# Patient Record
Sex: MALE | Race: WHITE | NOT HISPANIC OR LATINO | URBAN - METROPOLITAN AREA
[De-identification: names, ages, dates, MRNs, and addresses within clinical notes are randomized per-mention and may not be internally consistent; named-entity substitution may affect disease eponyms.]

---

## 2019-05-28 ENCOUNTER — INPATIENT (INPATIENT)
Facility: HOSPITAL | Age: 84
LOS: 5 days | Discharge: HOME HEALTH | DRG: 178 | End: 2019-06-03
Attending: INTERNAL MEDICINE | Admitting: INTERNAL MEDICINE
Payer: MEDICARE

## 2019-05-28 VITALS
DIASTOLIC BLOOD PRESSURE: 51 MMHG | HEART RATE: 102 BPM | WEIGHT: 154.98 LBS | HEIGHT: 69 IN | OXYGEN SATURATION: 91 % | RESPIRATION RATE: 28 BRPM | TEMPERATURE: 99 F | SYSTOLIC BLOOD PRESSURE: 118 MMHG

## 2019-05-28 DIAGNOSIS — J18.1 LOBAR PNEUMONIA, UNSPECIFIED ORGANISM: ICD-10-CM

## 2019-05-28 LAB
ALBUMIN SERPL ELPH-MCNC: 3.3 G/DL — SIGNIFICANT CHANGE UP (ref 3.3–5)
ALP SERPL-CCNC: 74 U/L — SIGNIFICANT CHANGE UP (ref 30–120)
ALT FLD-CCNC: 19 U/L DA — SIGNIFICANT CHANGE UP (ref 10–60)
ANION GAP SERPL CALC-SCNC: 7 MMOL/L — SIGNIFICANT CHANGE UP (ref 5–17)
AST SERPL-CCNC: 28 U/L — SIGNIFICANT CHANGE UP (ref 10–40)
BASOPHILS # BLD AUTO: 0.02 K/UL — SIGNIFICANT CHANGE UP (ref 0–0.2)
BASOPHILS NFR BLD AUTO: 0.1 % — SIGNIFICANT CHANGE UP (ref 0–2)
BILIRUB SERPL-MCNC: 1.2 MG/DL — SIGNIFICANT CHANGE UP (ref 0.2–1.2)
BUN SERPL-MCNC: 39 MG/DL — HIGH (ref 7–23)
CALCIUM SERPL-MCNC: 9 MG/DL — SIGNIFICANT CHANGE UP (ref 8.4–10.5)
CHLORIDE SERPL-SCNC: 100 MMOL/L — SIGNIFICANT CHANGE UP (ref 96–108)
CO2 SERPL-SCNC: 28 MMOL/L — SIGNIFICANT CHANGE UP (ref 22–31)
CREAT SERPL-MCNC: 1.92 MG/DL — HIGH (ref 0.5–1.3)
EOSINOPHIL # BLD AUTO: 0.01 K/UL — SIGNIFICANT CHANGE UP (ref 0–0.5)
EOSINOPHIL NFR BLD AUTO: 0.1 % — SIGNIFICANT CHANGE UP (ref 0–6)
FLU A RESULT: SIGNIFICANT CHANGE UP
FLU A RESULT: SIGNIFICANT CHANGE UP
FLUAV AG NPH QL: SIGNIFICANT CHANGE UP
FLUBV AG NPH QL: SIGNIFICANT CHANGE UP
GLUCOSE SERPL-MCNC: 144 MG/DL — HIGH (ref 70–99)
HCT VFR BLD CALC: 33.5 % — LOW (ref 39–50)
HGB BLD-MCNC: 11.3 G/DL — LOW (ref 13–17)
IMM GRANULOCYTES NFR BLD AUTO: 3.3 % — HIGH (ref 0–1.5)
INR BLD: 1.39 RATIO — HIGH (ref 0.88–1.16)
LACTATE SERPL-SCNC: 1.6 MMOL/L — SIGNIFICANT CHANGE UP (ref 0.7–2)
LYMPHOCYTES # BLD AUTO: 0.57 K/UL — LOW (ref 1–3.3)
LYMPHOCYTES # BLD AUTO: 4 % — LOW (ref 13–44)
MCHC RBC-ENTMCNC: 30.2 PG — SIGNIFICANT CHANGE UP (ref 27–34)
MCHC RBC-ENTMCNC: 33.7 GM/DL — SIGNIFICANT CHANGE UP (ref 32–36)
MCV RBC AUTO: 89.6 FL — SIGNIFICANT CHANGE UP (ref 80–100)
MONOCYTES # BLD AUTO: 0.75 K/UL — SIGNIFICANT CHANGE UP (ref 0–0.9)
MONOCYTES NFR BLD AUTO: 5.2 % — SIGNIFICANT CHANGE UP (ref 2–14)
NEUTROPHILS # BLD AUTO: 12.58 K/UL — HIGH (ref 1.8–7.4)
NEUTROPHILS NFR BLD AUTO: 87.3 % — HIGH (ref 43–77)
NRBC # BLD: 0 /100 WBCS — SIGNIFICANT CHANGE UP (ref 0–0)
NT-PROBNP SERPL-SCNC: HIGH PG/ML (ref 0–450)
PLATELET # BLD AUTO: 130 K/UL — LOW (ref 150–400)
POTASSIUM SERPL-MCNC: 4.5 MMOL/L — SIGNIFICANT CHANGE UP (ref 3.5–5.3)
POTASSIUM SERPL-SCNC: 4.5 MMOL/L — SIGNIFICANT CHANGE UP (ref 3.5–5.3)
PROT SERPL-MCNC: 7.1 G/DL — SIGNIFICANT CHANGE UP (ref 6–8.3)
PROTHROM AB SERPL-ACNC: 15.3 SEC — HIGH (ref 10–12.9)
RBC # BLD: 3.74 M/UL — LOW (ref 4.2–5.8)
RBC # FLD: 12.5 % — SIGNIFICANT CHANGE UP (ref 10.3–14.5)
RSV RESULT: SIGNIFICANT CHANGE UP
RSV RNA RESP QL NAA+PROBE: SIGNIFICANT CHANGE UP
SODIUM SERPL-SCNC: 135 MMOL/L — SIGNIFICANT CHANGE UP (ref 135–145)
WBC # BLD: 14.4 K/UL — HIGH (ref 3.8–10.5)
WBC # FLD AUTO: 14.4 K/UL — HIGH (ref 3.8–10.5)

## 2019-05-28 PROCEDURE — 99285 EMERGENCY DEPT VISIT HI MDM: CPT

## 2019-05-28 PROCEDURE — 93010 ELECTROCARDIOGRAM REPORT: CPT

## 2019-05-28 PROCEDURE — 71250 CT THORAX DX C-: CPT | Mod: 26

## 2019-05-28 PROCEDURE — 71046 X-RAY EXAM CHEST 2 VIEWS: CPT | Mod: 26

## 2019-05-28 RX ORDER — INSULIN LISPRO 100/ML
VIAL (ML) SUBCUTANEOUS AT BEDTIME
Refills: 0 | Status: DISCONTINUED | OUTPATIENT
Start: 2019-05-28 | End: 2019-06-03

## 2019-05-28 RX ORDER — SODIUM CHLORIDE 9 MG/ML
1000 INJECTION INTRAMUSCULAR; INTRAVENOUS; SUBCUTANEOUS ONCE
Refills: 0 | Status: COMPLETED | OUTPATIENT
Start: 2019-05-28 | End: 2019-05-28

## 2019-05-28 RX ORDER — HEPARIN SODIUM 5000 [USP'U]/ML
5000 INJECTION INTRAVENOUS; SUBCUTANEOUS EVERY 12 HOURS
Refills: 0 | Status: DISCONTINUED | OUTPATIENT
Start: 2019-05-28 | End: 2019-06-03

## 2019-05-28 RX ORDER — IPRATROPIUM/ALBUTEROL SULFATE 18-103MCG
3 AEROSOL WITH ADAPTER (GRAM) INHALATION ONCE
Refills: 0 | Status: COMPLETED | OUTPATIENT
Start: 2019-05-28 | End: 2019-05-28

## 2019-05-28 RX ORDER — PANTOPRAZOLE SODIUM 20 MG/1
40 TABLET, DELAYED RELEASE ORAL
Refills: 0 | Status: DISCONTINUED | OUTPATIENT
Start: 2019-05-28 | End: 2019-06-03

## 2019-05-28 RX ORDER — LACTOBACILLUS ACIDOPHILUS 100MM CELL
1 CAPSULE ORAL EVERY 8 HOURS
Refills: 0 | Status: DISCONTINUED | OUTPATIENT
Start: 2019-05-28 | End: 2019-06-03

## 2019-05-28 RX ORDER — PIPERACILLIN AND TAZOBACTAM 4; .5 G/20ML; G/20ML
3.38 INJECTION, POWDER, LYOPHILIZED, FOR SOLUTION INTRAVENOUS EVERY 12 HOURS
Refills: 0 | Status: DISCONTINUED | OUTPATIENT
Start: 2019-05-28 | End: 2019-05-31

## 2019-05-28 RX ORDER — INSULIN LISPRO 100/ML
VIAL (ML) SUBCUTANEOUS
Refills: 0 | Status: DISCONTINUED | OUTPATIENT
Start: 2019-05-28 | End: 2019-06-03

## 2019-05-28 RX ORDER — SODIUM CHLORIDE 9 MG/ML
1000 INJECTION INTRAMUSCULAR; INTRAVENOUS; SUBCUTANEOUS
Refills: 0 | Status: DISCONTINUED | OUTPATIENT
Start: 2019-05-28 | End: 2019-05-30

## 2019-05-28 RX ORDER — PIPERACILLIN AND TAZOBACTAM 4; .5 G/20ML; G/20ML
3.38 INJECTION, POWDER, LYOPHILIZED, FOR SOLUTION INTRAVENOUS ONCE
Refills: 0 | Status: COMPLETED | OUTPATIENT
Start: 2019-05-28 | End: 2019-05-28

## 2019-05-28 RX ORDER — ACETAMINOPHEN 500 MG
650 TABLET ORAL EVERY 6 HOURS
Refills: 0 | Status: DISCONTINUED | OUTPATIENT
Start: 2019-05-28 | End: 2019-06-03

## 2019-05-28 RX ORDER — GLUCAGON INJECTION, SOLUTION 0.5 MG/.1ML
1 INJECTION, SOLUTION SUBCUTANEOUS ONCE
Refills: 0 | Status: DISCONTINUED | OUTPATIENT
Start: 2019-05-28 | End: 2019-06-03

## 2019-05-28 RX ORDER — DEXTROSE 50 % IN WATER 50 %
12.5 SYRINGE (ML) INTRAVENOUS ONCE
Refills: 0 | Status: DISCONTINUED | OUTPATIENT
Start: 2019-05-28 | End: 2019-06-03

## 2019-05-28 RX ORDER — VANCOMYCIN HCL 1 G
1000 VIAL (EA) INTRAVENOUS ONCE
Refills: 0 | Status: COMPLETED | OUTPATIENT
Start: 2019-05-28 | End: 2019-05-28

## 2019-05-28 RX ORDER — DEXTROSE 50 % IN WATER 50 %
25 SYRINGE (ML) INTRAVENOUS ONCE
Refills: 0 | Status: DISCONTINUED | OUTPATIENT
Start: 2019-05-28 | End: 2019-06-03

## 2019-05-28 RX ORDER — DEXTROSE 50 % IN WATER 50 %
15 SYRINGE (ML) INTRAVENOUS ONCE
Refills: 0 | Status: DISCONTINUED | OUTPATIENT
Start: 2019-05-28 | End: 2019-06-03

## 2019-05-28 RX ORDER — SODIUM CHLORIDE 9 MG/ML
1000 INJECTION, SOLUTION INTRAVENOUS
Refills: 0 | Status: DISCONTINUED | OUTPATIENT
Start: 2019-05-28 | End: 2019-06-03

## 2019-05-28 RX ADMIN — PIPERACILLIN AND TAZOBACTAM 200 GRAM(S): 4; .5 INJECTION, POWDER, LYOPHILIZED, FOR SOLUTION INTRAVENOUS at 22:43

## 2019-05-28 RX ADMIN — Medication 250 MILLIGRAM(S): at 22:43

## 2019-05-28 RX ADMIN — SODIUM CHLORIDE 1000 MILLILITER(S): 9 INJECTION INTRAMUSCULAR; INTRAVENOUS; SUBCUTANEOUS at 22:43

## 2019-05-28 RX ADMIN — Medication 3 MILLILITER(S): at 21:59

## 2019-05-28 NOTE — ED PROVIDER NOTE - OBJECTIVE STATEMENT
89yo male bib ems from assisted living wthi cough, congestion today. pt states he has been having congestion and "gurgling" no sob, no vomiting or diarrhea

## 2019-05-28 NOTE — ED PROVIDER NOTE - PMH
Bladder cancer    Bladder polyps    BPH (benign prostatic hyperplasia)    HTN (hypertension)    LBBB (left bundle branch block)  noted on  EKG 2013  Obesity  BMI = 30.2  Prostate cancer  diagnosed in 2003; pt received radioactive seeds  Ureteric obstruction

## 2019-05-28 NOTE — ED PROVIDER NOTE - PSH
Bladder polyps  removed  Chest wall symptom  s/p reomval of shrapnel from right chest wall in 1952  H/O: hypertension    Prediabetes    S/P cataract surgery, left    S/P cystoscopy  multiple cystoscopies over 10 years with removal of  bladder polyps  S/P tonsillectomy  as a child

## 2019-05-28 NOTE — ED CLERICAL - CLERICAL COMMENTS
called dr. dexter at 1038 pm for dr. mosher called dr. dexter at 1038 pm for dr. mosher. called again at [phone].

## 2019-05-29 DIAGNOSIS — Z29.9 ENCOUNTER FOR PROPHYLACTIC MEASURES, UNSPECIFIED: ICD-10-CM

## 2019-05-29 DIAGNOSIS — I25.10 ATHEROSCLEROTIC HEART DISEASE OF NATIVE CORONARY ARTERY WITHOUT ANGINA PECTORIS: ICD-10-CM

## 2019-05-29 DIAGNOSIS — I10 ESSENTIAL (PRIMARY) HYPERTENSION: ICD-10-CM

## 2019-05-29 DIAGNOSIS — R74.8 ABNORMAL LEVELS OF OTHER SERUM ENZYMES: ICD-10-CM

## 2019-05-29 DIAGNOSIS — N17.9 ACUTE KIDNEY FAILURE, UNSPECIFIED: ICD-10-CM

## 2019-05-29 DIAGNOSIS — N28.9 DISORDER OF KIDNEY AND URETER, UNSPECIFIED: ICD-10-CM

## 2019-05-29 DIAGNOSIS — J18.1 LOBAR PNEUMONIA, UNSPECIFIED ORGANISM: ICD-10-CM

## 2019-05-29 LAB
ALBUMIN SERPL ELPH-MCNC: 2.8 G/DL — LOW (ref 3.3–5)
ALP SERPL-CCNC: 63 U/L — SIGNIFICANT CHANGE UP (ref 30–120)
ALT FLD-CCNC: 17 U/L DA — SIGNIFICANT CHANGE UP (ref 10–60)
ANION GAP SERPL CALC-SCNC: 8 MMOL/L — SIGNIFICANT CHANGE UP (ref 5–17)
APPEARANCE UR: CLEAR — SIGNIFICANT CHANGE UP
AST SERPL-CCNC: 29 U/L — SIGNIFICANT CHANGE UP (ref 10–40)
BASOPHILS # BLD AUTO: 0.02 K/UL — SIGNIFICANT CHANGE UP (ref 0–0.2)
BASOPHILS NFR BLD AUTO: 0.1 % — SIGNIFICANT CHANGE UP (ref 0–2)
BILIRUB SERPL-MCNC: 1.2 MG/DL — SIGNIFICANT CHANGE UP (ref 0.2–1.2)
BILIRUB UR-MCNC: NEGATIVE — SIGNIFICANT CHANGE UP
BUN SERPL-MCNC: 38 MG/DL — HIGH (ref 7–23)
CALCIUM SERPL-MCNC: 8.5 MG/DL — SIGNIFICANT CHANGE UP (ref 8.4–10.5)
CHLORIDE SERPL-SCNC: 100 MMOL/L — SIGNIFICANT CHANGE UP (ref 96–108)
CO2 SERPL-SCNC: 25 MMOL/L — SIGNIFICANT CHANGE UP (ref 22–31)
COLOR SPEC: YELLOW — SIGNIFICANT CHANGE UP
CREAT SERPL-MCNC: 1.85 MG/DL — HIGH (ref 0.5–1.3)
DIFF PNL FLD: ABNORMAL
EOSINOPHIL # BLD AUTO: 0 K/UL — SIGNIFICANT CHANGE UP (ref 0–0.5)
EOSINOPHIL NFR BLD AUTO: 0 % — SIGNIFICANT CHANGE UP (ref 0–6)
FERRITIN SERPL-MCNC: 432 NG/ML — HIGH (ref 30–400)
FOLATE SERPL-MCNC: >20 NG/ML — SIGNIFICANT CHANGE UP
GLUCOSE BLDC GLUCOMTR-MCNC: 108 MG/DL — HIGH (ref 70–99)
GLUCOSE BLDC GLUCOMTR-MCNC: 119 MG/DL — HIGH (ref 70–99)
GLUCOSE BLDC GLUCOMTR-MCNC: 121 MG/DL — HIGH (ref 70–99)
GLUCOSE BLDC GLUCOMTR-MCNC: 127 MG/DL — HIGH (ref 70–99)
GLUCOSE SERPL-MCNC: 122 MG/DL — HIGH (ref 70–99)
GLUCOSE UR QL: NEGATIVE MG/DL — SIGNIFICANT CHANGE UP
HBA1C BLD-MCNC: 5.4 % — SIGNIFICANT CHANGE UP (ref 4–5.6)
HCT VFR BLD CALC: 28.7 % — LOW (ref 39–50)
HCT VFR BLD CALC: 29.3 % — LOW (ref 39–50)
HGB BLD-MCNC: 9.7 G/DL — LOW (ref 13–17)
HGB BLD-MCNC: 9.8 G/DL — LOW (ref 13–17)
IMM GRANULOCYTES NFR BLD AUTO: 2.3 % — HIGH (ref 0–1.5)
IRON SATN MFR SERPL: 11 UG/DL — LOW (ref 45–165)
IRON SATN MFR SERPL: 6 % — LOW (ref 16–55)
KETONES UR-MCNC: NEGATIVE — SIGNIFICANT CHANGE UP
LDH SERPL L TO P-CCNC: 201 U/L — SIGNIFICANT CHANGE UP (ref 50–242)
LEUKOCYTE ESTERASE UR-ACNC: NEGATIVE — SIGNIFICANT CHANGE UP
LYMPHOCYTES # BLD AUTO: 0.96 K/UL — LOW (ref 1–3.3)
LYMPHOCYTES # BLD AUTO: 7.1 % — LOW (ref 13–44)
MCHC RBC-ENTMCNC: 29.7 PG — SIGNIFICANT CHANGE UP (ref 27–34)
MCHC RBC-ENTMCNC: 33.4 GM/DL — SIGNIFICANT CHANGE UP (ref 32–36)
MCV RBC AUTO: 88.8 FL — SIGNIFICANT CHANGE UP (ref 80–100)
MONOCYTES # BLD AUTO: 0.91 K/UL — HIGH (ref 0–0.9)
MONOCYTES NFR BLD AUTO: 6.7 % — SIGNIFICANT CHANGE UP (ref 2–14)
MRSA PCR RESULT.: SIGNIFICANT CHANGE UP
NEUTROPHILS # BLD AUTO: 11.37 K/UL — HIGH (ref 1.8–7.4)
NEUTROPHILS NFR BLD AUTO: 83.8 % — HIGH (ref 43–77)
NITRITE UR-MCNC: NEGATIVE — SIGNIFICANT CHANGE UP
NRBC # BLD: 0 /100 WBCS — SIGNIFICANT CHANGE UP (ref 0–0)
PH UR: 5 — SIGNIFICANT CHANGE UP (ref 5–8)
PLATELET # BLD AUTO: 121 K/UL — LOW (ref 150–400)
POTASSIUM SERPL-MCNC: 4.1 MMOL/L — SIGNIFICANT CHANGE UP (ref 3.5–5.3)
POTASSIUM SERPL-SCNC: 4.1 MMOL/L — SIGNIFICANT CHANGE UP (ref 3.5–5.3)
PROCALCITONIN SERPL-MCNC: 29.8 NG/ML — HIGH (ref 0.02–0.1)
PROT SERPL-MCNC: 6.3 G/DL — SIGNIFICANT CHANGE UP (ref 6–8.3)
PROT UR-MCNC: 30 MG/DL
RBC # BLD: 3.23 M/UL — LOW (ref 4.2–5.8)
RBC # BLD: 3.3 M/UL — LOW (ref 4.2–5.8)
RBC # FLD: 12.5 % — SIGNIFICANT CHANGE UP (ref 10.3–14.5)
RETICS #: 39.1 K/UL — SIGNIFICANT CHANGE UP (ref 25–125)
RETICS/RBC NFR: 1.2 % — SIGNIFICANT CHANGE UP (ref 0.5–2.5)
S AUREUS DNA NOSE QL NAA+PROBE: DETECTED
SODIUM SERPL-SCNC: 133 MMOL/L — LOW (ref 135–145)
SP GR SPEC: 1.02 — SIGNIFICANT CHANGE UP (ref 1.01–1.02)
TIBC SERPL-MCNC: 182 UG/DL — LOW (ref 220–430)
TROPONIN I SERPL-MCNC: 1.07 NG/ML — HIGH (ref 0.02–0.06)
TROPONIN I SERPL-MCNC: 1.22 NG/ML — HIGH (ref 0.02–0.06)
UIBC SERPL-MCNC: 171 UG/DL — SIGNIFICANT CHANGE UP (ref 110–370)
UROBILINOGEN FLD QL: NEGATIVE MG/DL — SIGNIFICANT CHANGE UP
VIT B12 SERPL-MCNC: 598 PG/ML — SIGNIFICANT CHANGE UP (ref 232–1245)
WBC # BLD: 13.57 K/UL — HIGH (ref 3.8–10.5)
WBC # FLD AUTO: 13.57 K/UL — HIGH (ref 3.8–10.5)

## 2019-05-29 PROCEDURE — 93970 EXTREMITY STUDY: CPT | Mod: 26

## 2019-05-29 PROCEDURE — 92610 EVALUATE SWALLOWING FUNCTION: CPT

## 2019-05-29 PROCEDURE — 76770 US EXAM ABDO BACK WALL COMP: CPT | Mod: 26

## 2019-05-29 PROCEDURE — 93010 ELECTROCARDIOGRAM REPORT: CPT

## 2019-05-29 RX ORDER — DIGOXIN 250 MCG
0.25 TABLET ORAL ONCE
Refills: 0 | Status: COMPLETED | OUTPATIENT
Start: 2019-05-29 | End: 2019-05-29

## 2019-05-29 RX ORDER — BUDESONIDE, MICRONIZED 100 %
0.5 POWDER (GRAM) MISCELLANEOUS
Refills: 0 | Status: DISCONTINUED | OUTPATIENT
Start: 2019-05-29 | End: 2019-06-03

## 2019-05-29 RX ORDER — MEMANTINE HYDROCHLORIDE 10 MG/1
5 TABLET ORAL
Refills: 0 | Status: DISCONTINUED | OUTPATIENT
Start: 2019-05-29 | End: 2019-06-03

## 2019-05-29 RX ORDER — ASPIRIN/CALCIUM CARB/MAGNESIUM 324 MG
81 TABLET ORAL DAILY
Refills: 0 | Status: DISCONTINUED | OUTPATIENT
Start: 2019-05-29 | End: 2019-06-03

## 2019-05-29 RX ORDER — METOPROLOL TARTRATE 50 MG
25 TABLET ORAL ONCE
Refills: 0 | Status: COMPLETED | OUTPATIENT
Start: 2019-05-29 | End: 2019-05-29

## 2019-05-29 RX ORDER — ATORVASTATIN CALCIUM 80 MG/1
10 TABLET, FILM COATED ORAL AT BEDTIME
Refills: 0 | Status: DISCONTINUED | OUTPATIENT
Start: 2019-05-29 | End: 2019-06-03

## 2019-05-29 RX ORDER — VENLAFAXINE HCL 75 MG
37.5 CAPSULE, EXT RELEASE 24 HR ORAL DAILY
Refills: 0 | Status: DISCONTINUED | OUTPATIENT
Start: 2019-05-29 | End: 2019-06-03

## 2019-05-29 RX ORDER — METOPROLOL TARTRATE 50 MG
25 TABLET ORAL
Refills: 0 | Status: DISCONTINUED | OUTPATIENT
Start: 2019-05-29 | End: 2019-06-03

## 2019-05-29 RX ORDER — DOXAZOSIN MESYLATE 4 MG
4 TABLET ORAL AT BEDTIME
Refills: 0 | Status: DISCONTINUED | OUTPATIENT
Start: 2019-05-29 | End: 2019-06-03

## 2019-05-29 RX ORDER — IPRATROPIUM/ALBUTEROL SULFATE 18-103MCG
3 AEROSOL WITH ADAPTER (GRAM) INHALATION EVERY 6 HOURS
Refills: 0 | Status: DISCONTINUED | OUTPATIENT
Start: 2019-05-29 | End: 2019-06-03

## 2019-05-29 RX ADMIN — Medication 3 MILLILITER(S): at 08:18

## 2019-05-29 RX ADMIN — Medication 1 TABLET(S): at 11:55

## 2019-05-29 RX ADMIN — Medication 1 TABLET(S): at 05:10

## 2019-05-29 RX ADMIN — Medication 25 MILLIGRAM(S): at 16:56

## 2019-05-29 RX ADMIN — Medication 200 MILLIGRAM(S): at 05:09

## 2019-05-29 RX ADMIN — MEMANTINE HYDROCHLORIDE 5 MILLIGRAM(S): 10 TABLET ORAL at 05:10

## 2019-05-29 RX ADMIN — Medication 1200 MILLIGRAM(S): at 16:49

## 2019-05-29 RX ADMIN — Medication 25 MILLIGRAM(S): at 16:57

## 2019-05-29 RX ADMIN — HEPARIN SODIUM 5000 UNIT(S): 5000 INJECTION INTRAVENOUS; SUBCUTANEOUS at 16:50

## 2019-05-29 RX ADMIN — ATORVASTATIN CALCIUM 10 MILLIGRAM(S): 80 TABLET, FILM COATED ORAL at 21:18

## 2019-05-29 RX ADMIN — SODIUM CHLORIDE 50 MILLILITER(S): 9 INJECTION INTRAMUSCULAR; INTRAVENOUS; SUBCUTANEOUS at 21:04

## 2019-05-29 RX ADMIN — PANTOPRAZOLE SODIUM 40 MILLIGRAM(S): 20 TABLET, DELAYED RELEASE ORAL at 05:11

## 2019-05-29 RX ADMIN — Medication 0.5 MILLIGRAM(S): at 19:54

## 2019-05-29 RX ADMIN — Medication 4 MILLIGRAM(S): at 21:19

## 2019-05-29 RX ADMIN — HEPARIN SODIUM 5000 UNIT(S): 5000 INJECTION INTRAVENOUS; SUBCUTANEOUS at 05:10

## 2019-05-29 RX ADMIN — PIPERACILLIN AND TAZOBACTAM 25 GRAM(S): 4; .5 INJECTION, POWDER, LYOPHILIZED, FOR SOLUTION INTRAVENOUS at 10:16

## 2019-05-29 RX ADMIN — Medication 0.25 MILLIGRAM(S): at 16:49

## 2019-05-29 RX ADMIN — Medication 81 MILLIGRAM(S): at 11:54

## 2019-05-29 RX ADMIN — Medication 1200 MILLIGRAM(S): at 05:10

## 2019-05-29 RX ADMIN — Medication 1 TABLET(S): at 21:18

## 2019-05-29 RX ADMIN — Medication 3 MILLILITER(S): at 19:56

## 2019-05-29 RX ADMIN — Medication 37.5 MILLIGRAM(S): at 11:54

## 2019-05-29 RX ADMIN — MEMANTINE HYDROCHLORIDE 5 MILLIGRAM(S): 10 TABLET ORAL at 16:57

## 2019-05-29 RX ADMIN — PIPERACILLIN AND TAZOBACTAM 25 GRAM(S): 4; .5 INJECTION, POWDER, LYOPHILIZED, FOR SOLUTION INTRAVENOUS at 21:06

## 2019-05-29 RX ADMIN — Medication 3 MILLILITER(S): at 03:05

## 2019-05-29 NOTE — H&P ADULT - NSHPSOCIALHISTORY_GEN_ALL_CORE
resident in [facility] for 1 year after sold house in [place] .Ambulates independently ,enjoyes country dancing .Denies smoking and etoh

## 2019-05-29 NOTE — H&P ADULT - PROBLEM SELECTOR PLAN 5
Gastrointestinal stress ulcer prophylaxis and DVT prophylaxis administrated serial bmp , nephrology eval , ins/outs ,check renal us

## 2019-05-29 NOTE — CONSULT NOTE ADULT - ASSESSMENT
89 yo male ,[facility] resident with hx of bladder cancer , bladder polyps , prostate cancer , BPH ,cataract ,obesity , urinary retention , HTN , CAD ,LBBB, HLD , PREDIABETES , DEPRESSION ,DEMENTIA presents to ED with cough and chest congestion x 2-3 days CTT showed LLL PNEUMONIA now with ckd and gene

## 2019-05-29 NOTE — SWALLOW BEDSIDE ASSESSMENT ADULT - SWALLOW EVAL: RECOMMENDED FEEDING/EATING TECHNIQUES
alternate food with liquid/hard swallow w/ each bite or sip/allow for swallow between intakes/maintain upright posture during/after eating for 30 mins/position upright (90 degrees)/oral hygiene

## 2019-05-29 NOTE — GOALS OF CARE CONVERSATION - PERSONAL ADVANCE DIRECTIVE - CONVERSATION DETAILS
Spoke to pt daughter regarding advance directives . Resuscitation was explained and she states pt wants no CPR or other extraordinary methods to keep him alive

## 2019-05-29 NOTE — H&P ADULT - HISTORY OF PRESENT ILLNESS
89 yo male ,[facility] resident with hx of bladder cancer , bladder polyps , prostate cancer , BPH ,cataract ,obesity , urinary retention , HTN , CAD ,LBBB, HLD , PREDIABETES , DEPRESSION ,DEMENTIA presents to ED with cough and chest congestion x 2-3 days CTT showed LLL PNEUMONIA Admitted for septic workup and evaluation,send blood and urine cx,serial lactate levels,monitor vitals closley,ivfs hydration,monitor urine output and renal profile,iv abx initiated -started on zosyn in ER Pulmonology consult called Found to have LBBB on EKG ( known from  2013 ) ,cardiology consult called .Found to have BNP elevation ,but no PVC or signs of fluid overload Admit to monitored unit for cardiac monitoring, obtain echo to evaluate LVEF, monitor ins/outs, monitor renal profile and electrolytes closely, cardiology consult ,send serial bnp , O2 supply, serial chest xrays, monitor weights and oral intake of fluids, nutritionist consult Palliative care consult requested ,to discuss advance directives and complete MOLST 89 yo male ,[facility] resident with hx of bladder cancer , bladder polyps , prostate cancer , BPH ,cataract ,obesity , urinary retention , HTN , CAD ,LBBB, HLD , PREDIABETES , DEPRESSION ,DEMENTIA presents to ED with cough and chest congestion x 2-3 days CTT showed LLL PNEUMONIA Admitted for septic workup and evaluation,send blood and urine cx,serial lactate levels,monitor vitals closley,ivfs hydration,monitor urine output and renal profile,iv abx initiated -started on zosyn in ER Pulmonology consult called Found to have LBBB on EKG ( known from  2013 ) ,cardiology consult called .Found to have BNP elevation ,but no PVC or signs of fluid overload Admit to monitored unit for cardiac monitoring, obtain echo to evaluate LVEF, monitor ins/outs, monitor renal profile and electrolytes closely, cardiology consult ,send serial bnp , O2 supply, serial chest xrays, monitor weights and oral intake of fluids, nutritionist consult Palliative care consult requested ,to discuss advance directives and complete MOLST Found to have troponin elevation ,likely related to renal insufficiency and demand ischemia 2/2 to pneumonia  Admitted  to telemetry unit for monitoring , send 3 sets of cardiac ensymes to rule out coronary event, obtain ECHO to evaluate LVEF, cardiology consult ,continue current management, O2 supply, anticoagulation plan as per cardiology consult

## 2019-05-29 NOTE — SWALLOW BEDSIDE ASSESSMENT ADULT - PHARYNGEAL PHASE
Decreased laryngeal elevation/Delayed pharyngeal swallow/Throat clear post oral intake/Delayed throat clear post oral intake/Delayed cough post oral intake/Cough post oral intake Delayed pharyngeal swallow/Cough post oral intake/Delayed cough post oral intake/Delayed throat clear post oral intake/Decreased laryngeal elevation/Throat clear post oral intake

## 2019-05-29 NOTE — CONSULT NOTE ADULT - SUBJECTIVE AND OBJECTIVE BOX
Patient chart was reviewed Workup and management orders based on current assessment have been entered to expedite patient care  Nursing notified for stat orders as applicable Detailed note and further workup and management orders (if needed)  will be entered in the space below / MD order section after patient has been examined Patient chart was reviewed Workup and management orders based on current assessment have been entered to expedite patient care  Nursing notified for stat orders as applicable Detailed note and further workup and management orders (if needed)  will be entered in the space below / MD order section after patient has been examined     ALVARO BAKER [facility] [address]  3/16/1929 DOA 2019 DR ELISSA DC  ALLERGY     nka   CONTACT     [address]r  [id number] self   Initial evaluation/Pulmonary Critical Care consultation requested on  2019   by Dr Dc  from Dr Obando   Patient examined chart reviewed    HOSPITAL ADMISSION [facility] DR ELISSA DC  PATIENT CAME  FROM (if information available)        TYPE OF VISIT      Subsequent Pulmonary followup     REASON FOR VISIT  For list of problems evaluated/addressed, please see problem list in the note below     PATIENT ALVARO BAKER [facility] [address]  3/16/1929 DOA 2019 DR ELISSA DC    PT DESCRIPTION     · Chief Complaint: The patient is a 89y Male complaining of  · HPI Objective Statement: 89yo male bib ems from assisted living wthi cough, congestion today. pt states he has been having congestion and "gurgling" no sob, no vomiting or diarrhea  · Presenting Symptoms: CONGESTION, COUGH  · Negative Findings: no shortness of breath, no vomiting  · Timing: gradual onset, constant  · Duration: today  · Quality: non-productive cough  · Severity: MILD  · Recent Exposure To: none known  · Relieving Factors: none    PAST MEDICAL/SURGICAL/FAMILY/SOCIAL HISTORY:    Past Medical History:  Bladder cancer    Bladder polyps    BPH (benign prostatic hyperplasia)    HTN (hypertension)    LBBB (left bundle branch block)  noted on  EKG   Obesity  BMI = 30.2  Prostate cancer  diagnosed in ; pt received radioactive seeds  Ureteric obstruction.     Past Surgical History:  Bladder polyps  removed  Chest wall symptom  s/p reomval of shrapnel from right chest wall in 2  H/O: hypertension    Prediabetes    S/P cataract surgery, left    S/P cystoscopy  multiple cystoscopies over 10 years with removal of  bladder polyps  S/P tonsillectomy  as a child.     Tobacco Usage:  · Tobacco Usage Former smoker    ALLERGIES AND HOME MEDICATIONS:   Allergies:        Allergies:  No Known Drug Allergies:   seasonal allergies: Miscellaneous, Other, seasonal allergies    Home Medications:   * Incomplete Medication History as of 24-Sep-2015 15:14 documented in Structured Notes  · see medication reconciliation sheet:       REVIEW OF SYSTEMS:    Review of Systems:  · RESPIRATORY: - - -  · Respiratory [+]: COUGH  · ROS STATEMENT: all other ROS negative except as per HPI    PHYSICAL EXAM:   · CONSTITUTIONAL: - - -  · Appearance: well appearing  · Development: well developed  · Distress: no apparent  · Mentation: awake, alert  · Mood: appropriate  · Nourishment: well  · CARDIAC: - - -  · CARDIAC RHYTHM: IRREGULAR  · CARDIAC RATE: normal  · CARDIAC SOUNDS: S1-S2  · CARDIAC MURMUR: no murmur  · RESPIRATORY: - - -  · Respiratory Distress: no  · Breath Sounds: RHONCHI  · Rhonchi: DIFFUSE  · Chest Exam: normal  · GASTROINTESTINAL: Abdomen soft, non-tender, no guarding.  · MUSCULOSKELETAL: Spine appears normal, range of motion is not limited, no muscle or joint tenderness  · NEUROLOGICAL: - - -  · NEURO LOC: alert  · NEURO NECK: normal  · NEURO SPEECH: clear  · SKIN: Skin normal color for race, warm, dry and intact. No evidence of rash.  · PSYCHIATRIC: - - -  · PSYCH LOC: alert    PATIENT NEEDLE OLIVIA [facility] [address]  3/16/1929 DOA 2019 DR ELISSA DC    VITALS/LABS       2019 W 14.4 Hb 11.3 Plt 130  Na 1135 K 4.5 CO2 28 Cr 1.9   2019 bnp [id number]    PATIENT NEEDLE OLIVIA [facility] [address] 56 84  3/16/1929 DOA 2019 DR ELISSA DC    PROBLEMS     PNEUMONIA   W 2019 W 14.4  C 2019 CT ch mf pneumesp lll and lingula   CHF  B  bnp 92104  OLIVIA   C 2019   Cr 1.9     PATIENT  ALVARO BAKER [facility] [address]  3/16/1929 DOA 2019 DR ELISSA DC    MEDICATIONS     CAD   Metoprolol 25.2 (2019)   ASA 81 (2019)   DVT P   hpsc (2019)   ASP PNEUM  zosyn (2019)   IV   NS 50 (2019)   DM   iss (2019)   COPD   Duoneb (2019)     GLOBAL ISSUE/BEST PRACTICE:      PROBLEM: HOB elevation:   y            PROBLEM: Stress ulcer proph:    na                      PROBLEM: VTE prophylaxis:       PROBLEM: Glycemic control:    na  PROBLEM: Nutrition:   Cons carb (2019)  PROBLEM: Advanced directive: na     PROBLEM: Allergies:  na        PATIENT ALVARO BAKER [facility] [address]  3/16/1929 DOA 2019 DR ELISSA DC      PT DESCRIPTION      89 m from [facility] with chest congestion admitted [facility] S 2019 with mf pneumposs chf     PMH Bladder ca BPH LBBB Obesity Prostate ca 2003 sp radioactive seeds ureteric obstruction     ER vital 118/51 102 28 99f 70

## 2019-05-29 NOTE — H&P ADULT - NEUROLOGICAL DETAILS
alert and oriented x 3/sensation intact/normal strength/responds to pain/responds to verbal commands/deep reflexes intact/no spontaneous movement

## 2019-05-29 NOTE — CONSULT NOTE ADULT - PROBLEM SELECTOR RECOMMENDATION 9
ACUTE RENAL FAILURE: hold diuretics   continue with hydration will f/u with bun and cr   Serum creatinine is stable at 1.85    , approximating GFR is decreased    ml/min.   There is  progression . No uremic symptoms    pos  evidence of anemia .  Fluid status stable.  Will continue to avoid nephrotoxic drugs.  Patient remains asymptomatic.   Continue current therapy.

## 2019-05-29 NOTE — H&P ADULT - ATTENDING COMMENTS
89 yo male ,[facility] resident with hx of bladder cancer , bladder polyps , prostate cancer , BPH ,cataract ,obesity , urinary retention , HTN , CAD ,LBBB, HLD , PREDIABETES , DEPRESSION ,DEMENTIA presents to ED with cough and chest congestion x 2-3 days CTT showed LLL PNEUMONIA Admitted for septic workup and evaluation,send blood and urine cx,serial lactate levels,monitor vitals closley,ivfs hydration,monitor urine output and renal profile,iv abx initiated -started on zosyn in ER Pulmonology consult called Found to have LBBB on EKG ( known from  2013 ) ,cardiology consult called .Found to have BNP elevation ,but no PVC or signs of fluid overload Admit to monitored unit for cardiac monitoring, obtain echo to evaluate LVEF, monitor ins/outs, monitor renal profile and electrolytes closely, cardiology consult ,send serial bnp , O2 supply, serial chest xrays, monitor weights and oral intake of fluids, nutritionist consult Palliative care consult requested ,to discuss advance directives and complete MOLST Found to have troponin elevation ,likely related to renal insufficiency and demand ischemia 2/2 to pneumonia  Admitted  to telemetry unit for monitoring , send 3 sets of cardiac ensymes to rule out coronary event, obtain ECHO to evaluate LVEF, cardiology consult ,continue current management, O2 supply, anticoagulation plan as per cardiology consult

## 2019-05-29 NOTE — H&P ADULT - PROBLEM SELECTOR PLAN 4
serial bmp ,monitor ins/outs , renal us , nephrology consult ,avoid nephrotoxic drugs likely 2/2 to renal insufficiency and demand ischemia due to pneumonia ,seen by cardiologist

## 2019-05-29 NOTE — CONSULT NOTE ADULT - SUBJECTIVE AND OBJECTIVE BOX
Infectious Diseases Consult by Cristopher Franklin MD    Reason for Consult :    HPI:  89 yo male ,long term resident with hx of bladder cancer , bladder polyps , prostate cancer , BPH ,cataract ,obesity , urinary retention , HTN , CAD ,LBBB, HLD , PREDIABETES , DEPRESSION ,DEMENTIA presents to ED with cough and chest congestion x 2-3 days CTT showed LLL PNEUMONIA Admitted for septic workup and evaluation on  -started on zosyn in ER Pulmonology consult called Found to have LBBB on EKG ( known from   ) ,cardiology consult called .Found to have BNP elevation ,    Patient was found to have congestion and "gurgling" no sob, no vomiting or diarrhea at the long term. No hx of fever . He was sen by speech therapy and has lubna scheduled for MBS      Past Medical & Surgical Hx:  PAST MEDICAL & SURGICAL HISTORY:  Bladder cancer  Obesity: BMI = 30.2  LBBB (left bundle branch block): noted on  EKG   Bladder polyps  Prostate cancer: diagnosed in ; pt received radioactive seeds  BPH (benign prostatic hyperplasia)  HTN (hypertension)  Ureteric obstruction  Prediabetes  H/O: hypertension  S/P cataract surgery, left  Bladder polyps: removed  S/P cystoscopy: multiple cystoscopies over 10 years with removal of  bladder polyps  S/P tonsillectomy: as a child  Chest wall symptom: s/p reomval of shrapnel from right chest wall in 195      Social History-- Retired, resident of long term  EtOH: denies   Tobacco: denies   Drug Use: denies     FAMILY HISTORY:      Allergies    No Known Drug Allergies  seasonal allergies (Other)    Intolerances        Home/ Out patient  Medications :  Home Medications:  aspirin 81 mg oral tablet, chewable: 81 milligram(s) orally once a day (28 May 2019 23:39)  atorvastatin 10 mg oral tablet: 1 tab(s) orally once a day (at bedtime) (28 May 2019 23:39)  memantine 5 mg oral tablet: 1 tab(s) orally 2 times a day (28 May 2019 23:39)  metoprolol tartrate 25 mg oral tablet: 1 tab(s) orally 2 times a day (28 May 2019 23:39)  see medication reconciliation sheet:    (28 May 2019 20:35)  Tab-A-Iman oral tablet: 1 tab(s) orally once a day (28 May 2019 23:39)  terazosin 5 mg oral capsule: 1 cap(s) orally once a day (at bedtime) (28 May 2019 23:39)  venlafaxine 37.5 mg oral tablet: 1 tab(s) orally once a day (28 May 2019 23:39)      Current Inpatient Medications :    ANTIBIOTICS:   piperacillin/tazobactam IVPB. 3.375 Gram(s) IV Intermittent every 12 hours      OTHER RELEVANT MEDICATIONS :  acetaminophen   Tablet .. 650 milliGRAM(s) Oral every 6 hours PRN  ALBUTerol/ipratropium for Nebulization 3 milliLiter(s) Nebulizer every 6 hours PRN  aspirin  chewable 81 milliGRAM(s) Oral daily  atorvastatin 10 milliGRAM(s) Oral at bedtime  buDESOnide   0.5 milliGRAM(s) Respule 0.5 milliGRAM(s) Inhalation two times a day  dextrose 40% Gel 15 Gram(s) Oral once PRN  dextrose 5%. 1000 milliLiter(s) IV Continuous <Continuous>  dextrose 50% Injectable 12.5 Gram(s) IV Push once  dextrose 50% Injectable 25 Gram(s) IV Push once  dextrose 50% Injectable 25 Gram(s) IV Push once  doxazosin 4 milliGRAM(s) Oral at bedtime  glucagon  Injectable 1 milliGRAM(s) IntraMuscular once PRN  guaiFENesin   Syrup  (Sugar-Free) 200 milliGRAM(s) Oral every 8 hours PRN  guaiFENesin ER 1200 milliGRAM(s) Oral every 12 hours  heparin  Injectable 5000 Unit(s) SubCutaneous every 12 hours  insulin lispro (HumaLOG) corrective regimen sliding scale   SubCutaneous three times a day before meals  insulin lispro (HumaLOG) corrective regimen sliding scale   SubCutaneous at bedtime  memantine 5 milliGRAM(s) Oral two times a day  metoprolol tartrate 25 milliGRAM(s) Oral two times a day  multivitamin 1 Tablet(s) Oral daily  pantoprazole    Tablet 40 milliGRAM(s) Oral before breakfast  sodium chloride 0.9%. 1000 milliLiter(s) IV Continuous <Continuous>  venlafaxine 37.5 milliGRAM(s) Oral daily      ROS:  CONSTITUTIONAL:  Negative fever or chills, feels well, good appetite  EYES:  Negative  blurry vision or double vision  CARDIOVASCULAR:  Negative for chest pain or palpitations  RESPIRATORY:  Positive  for cough, wheezing, or SOB   GASTROINTESTINAL:  Negative for nausea, vomiting, diarrhea, constipation, or abdominal pain  GENITOURINARY:  Negative frequency, urgency , dysuria or hematuria   NEUROLOGIC:  No headache, confusion, dizziness, lightheadedness  All other systems were reviewed and are negative          I&O's Detail    28 May 2019 07:01  -  29 May 2019 07:00  --------------------------------------------------------  IN:  Total IN: 0 mL    OUT:    Voided: 400 mL  Total OUT: 400 mL    Total NET: -400 mL          Physical Exam:  Vital Signs Last 24 Hrs  T(C): 36.4 (29 May 2019 11:17), Max: 37.3 (28 May 2019 20:24)  T(F): 97.6 (29 May 2019 11:17), Max: 99.2 (28 May 2019 20:24)  HR: 95 (29 May 2019 11:17) (81 - 113)  BP: 101/70 (29 May 2019 11:17) (88/68 - 118/51)  BP(mean): --  RR: 25 (29 May 2019 11:17) (18 - 28)  SpO2: 96% (29 May 2019 11:17) (91% - 98%)  Height (cm): 175.26 ( @ 20:24)  Weight (kg): 70.3 ( @ 20:24)  BMI (kg/m2): 22.9 ( @ 20:24)  BSA (m2): 1.85 ( @ 20:24)    General: well developed well nourished, in no acute distress  Eyes: sclera anicteric, pupils equal and reactive to light  ENMT: buccal mucosa moist, pharynx not injected  Neck: supple, trachea midline  Lungs: coarse breath sounds  no wheeze/rhonchi  Cardiovascular: regular rate and rhythm, S1 S2  Abdomen: soft, nontender, no organomegaly present, bowel sounds normal  Neurological:  alert and oriented x1, Cranial Nerves II-XII grossly intact  Skin:no increased ecchymosis/petechiae/purpura  Lymph Nodes: no palpable cervical/supraclavicular lymph nodes enlargements  Extremities: no cyanosis/clubbing, has 1 + leg edema     Labs:                    9.8    13.57  )----------(  121       ( 29 May 2019 06:47 )               29.3      133    |  100    |  38     ----------------------------<  122        ( 29 May 2019 06:47 )  4.1     |  25     |  1.85     Ca    8.5        ( 29 May 2019 06:47 )    TPro  6.3    /  Alb  2.8    /  TBili  1.2    /  DBili  x      /  AST  29     /  ALT  17     /  AlkPhos  63     ( 29 May 2019 06:47 )    LIVER FUNCTIONS - ( 29 May 2019 06:47 )  Alb: 2.8 g/dL / Pro: 6.3 g/dL / ALK PHOS: 63 U/L / ALT: 17 U/L DA / AST: 29 U/L / GGT: x           PT/INR -  15.3 sec / 1.39 ratio   ( 28 May 2019 21:05 )     CARDIAC MARKERS ( 29 May 2019 06:47 )  1.217 ng/mL / x     / x     / x     / x        Serum Pro-Brain Natriuretic Peptide (19 @ 21:05)    Serum Pro-Brain Natriuretic Peptide: 21540 pg/mL    Urinalysis Basic - ( 29 May 2019 02:45 )    Color: Yellow / Appearance: Clear / S.020 / pH: x  Gluc: x / Ketone: Negative  / Bili: Negative / Urobili: Negative mg/dL   Blood: x / Protein: 30 mg/dL / Nitrite: Negative   Leuk Esterase: Negative / RBC: 0-2 /HPF / WBC Negative   Sq Epi: x / Non Sq Epi: Occasional / Bacteria: Few    Lactate, Blood: 1.6 mmol/L (19 @ 21:05)      RECENT CULTURES:    FLU A B RSV Detection by PCR (19 @ 21:05)    Flu A Result: NotDete: The Flu A B RSV assay is a Real-Time PCR test for the qualitative  detection and differentiation of Influenza A, Influenza B, and  Respiratory Syncytial Virus on nasopharyngeal swabs. The results should  be interpreted in the context of all clinical and laboratory findings.    Flu B Result: NotDetec    RSV Result: NotDetec          RADIOLOGY & ADDITIONAL STUDIES:    US Duplex Venous Lower Ext Complete, Bilateral (19 @ 07:46) >  FINDINGS:  There is color and spectral flow, compression and augmentation of the   common femoral, superficial femoral and popliteal veins.    There is flow in the posterior tibial vein.    IMPRESSION:  No evidence of DVT.      CT Chest No Cont (19 @ 21:50) >  CHEST:     LUNGS AND LARGE AIRWAYS: Patent central airways. Mild emphysema. Patchy   left lower lobe and lingular nodular airspace opacities compatible with   pneumonia. Dependent atelectasis at the lung bases. Tree-in-bud nodules   scattered throughout the lower lobes and lingula..  PLEURA: No pleural effusion.  VESSELS: Coronary artery calcifications. Atherosclerotic calcifications   of the aorta and great vessels.  HEART: Heart size is normal.No pericardial effusion.  MEDIASTINUM AND MITCH: No lymphadenopathy.  CHEST WALL AND LOWER NECK: Within normal limits.  VISUALIZED UPPER ABDOMEN: Upper lobe renal cyst.  BONES: Degenerative changes of the spine.    IMPRESSION: Multifocal pneumonia predominantly involving the left lower   lobe and lingula.      Xray Chest 2 Views PA/Lat (19 @ 20:40) >  Findings: Right-sided metallic axillary surgical clips are noted.    There is pulmonary vascular congestion and/or edema. The heart size is   enlarged. Multilevel degenerative changes are noted within the imaged   potions of the spine.    IMPRESSION: Pulmonary vascular congestion and/or edema.      Assessment :   89 yo male ,long term resident with hx of bladder cancer , bladder polyps , prostate cancer , BPH ,cataract ,obesity , urinary retention , HTN , CAD ,LBBB, HLD , PREDIABETES , DEPRESSION ,DEMENTIA presents to ED with cough and chest congestion x 2-3 days CTT showed LLL Nodular and patchy pneumonia , as he has oropharyngeal dysphagia likely its is aspiration pneumonia . He is afebrile and does not have significant leukocytosis.    He could have CHF exacerbation , he has elevated troponin and PBNP     Plan :   - will follow procalcitonin and cs results , till than continue with Zosyn   - screen for MRSA  - follow MBS and echo   - await cardiology evaluaiton   - aspiration precautions   - consider DC IVF     Continue with present regime .  Appropriate use of antibiotics and adverse effects reviewed.      I have discussed the above plan of care with patient and his family in detail. They expressed understanding of the treatment plan . Risks, benefits and alternatives discussed in detail. I have asked if they have any questions or concerns and appropriately addressed them to the best of my ability . Goals of care conversation and MOLST form to be done by palliative care team       > 55  minutes spent in direct patient care reviewing  the notes, lab data/ imaging , discussion with multidisciplinary team. All questions were addressed and answered to the best of my capacity .    Thank you for allowing me to participate in the care of your patient .      Cristopher Franklin MD  [phone]

## 2019-05-29 NOTE — H&P ADULT - NSICDXPASTMEDICALHX_GEN_ALL_CORE_FT
PAST MEDICAL HISTORY:  Bladder cancer     Bladder polyps     BPH (benign prostatic hyperplasia)     HTN (hypertension)     LBBB (left bundle branch block) noted on  EKG 2013    Obesity BMI = 30.2    Prostate cancer diagnosed in 2003; pt received radioactive seeds    Ureteric obstruction

## 2019-05-29 NOTE — SWALLOW BEDSIDE ASSESSMENT ADULT - SWALLOW EVAL: RECOMMENDED DIET
will defer PO diet to MD at this time pending completion of recommended MBSS, given presence of baseline cough which persisted throughout evaluation and disallowed for formal r/o of penetration/aspiration via clinical assessment alone.

## 2019-05-29 NOTE — H&P ADULT - NSHPLABSRESULTS_GEN_ALL_CORE
< from: US Duplex Venous Lower Ext Complete, Bilateral (05.29.19 @ 07:46) >    EXAM:  US DPLX LWR EXT VEINS COMPL BI                                  PROCEDURE DATE:  05/29/2019          INTERPRETATION:  CLINICAL STATEMENT: Swelling leg.    TECHNIQUE: Ultrasound of bilateral lower extremity deep venous system.    COMPARISON: None.    FINDINGS:  There is color and spectral flow, compression and augmentation of the   common femoral, superficial femoral and popliteal veins.    There is flow in the posterior tibial vein.    IMPRESSION:  No evidence of DVT.      < end of copied text >    < from: CT Chest No Cont (05.28.19 @ 21:50) >    EXAM:  CT CHEST                                  PROCEDURE DATE:  05/28/2019          INTERPRETATION:  CLINICAL INFORMATION: Shortness of breath    COMPARISON: None    PROCEDURE:   CT of the Chest was performed without intravenous contrast.  Sagittal and coronal reformats were performed.      FINDINGS:    CHEST:     LUNGS AND LARGE AIRWAYS: Patent central airways. Mild emphysema. Patchy   left lower lobe and lingular nodular airspace opacities compatible with   pneumonia. Dependent atelectasis at the lung bases. Tree-in-bud nodules   scattered throughout the lower lobes and lingula..  PLEURA: No pleural effusion.  VESSELS: Coronary artery calcifications. Atherosclerotic calcifications   of the aorta and great vessels.  HEART: Heart size is normal.No pericardial effusion.  MEDIASTINUM AND MITCH: No lymphadenopathy.  CHEST WALL AND LOWER NECK: Within normal limits.  VISUALIZED UPPER ABDOMEN: Upper lobe renal cyst.  BONES: Degenerative changes of the spine.    IMPRESSION: Multifocal pneumonia predominantly involving the left lower   lobe and lingula.    < end of copied text >

## 2019-05-29 NOTE — H&P ADULT - NEGATIVE GASTROINTESTINAL SYMPTOMS
no nausea/no hematochezia/no vomiting/no flatulence/no abdominal pain/no melena/no diarrhea/no change in bowel habits/no constipation

## 2019-05-29 NOTE — SWALLOW BEDSIDE ASSESSMENT ADULT - COMMENTS
Pt was awake and cooperative for a clinical assessment of swallow function this AM. Pt presents with supplemental oxygen in place via nasal cannula with SaO2 noted 94-97% throughout evaluation. Per charting, pt is a "89 yo male ,[facility] resident with hx of bladder cancer , bladder polyps , prostate cancer , BPH ,cataract ,obesity , urinary retention , HTN , CAD ,LBBB, HLD , PREDIABETES , DEPRESSION ,DEMENTIA presents to ED with cough and chest congestion x 2-3 days CTT showed LLL PNEUMONIA Admitted for septic workup and evaluation,send blood and urine cx,serial lactate levels,monitor vitals closley,ivfs hydration,monitor urine output and renal profile,iv abx initiated -started on zosyn in ER Pulmonology consult called Found to have LBBB on EKG ( known from  2013 ) ,cardiology consult called .Found to have BNP elevation ,but no PVC or signs of fluid overload Admit to monitored unit for cardiac monitoring, obtain echo to evaluate LVEF, monitor ins/outs, monitor renal profile and electrolytes closely, cardiology consult ,send serial bnp , O2 supply, serial chest xrays, monitor weights and oral intake of fluids, nutritionist consult Palliative care consult requested ,to discuss advance directives and complete MOLST Found to have troponin elevation ,likely related to renal insufficiency and demand ischemia 2/2 to pneumonia  Admitted  to telemetry unit for monitoring , send 3 sets of cardiac ensymes to rule out coronary event, obtain ECHO to evaluate LVEF, cardiology consult ,continue current management, O2 supply, anticoagulation plan as per cardiology consult". Recent chest CT revealed "Multifocal pneumonia predominantly involving the left lower lobe and lingula. "    At the time of today's assessment, pt presents with baseline cough and wet upper airway breath sounds prior to provision of PO trials

## 2019-05-29 NOTE — CONSULT NOTE ADULT - ASSESSMENT
The patient is a 89 year old male with a history of HTN, HL, bladder cancer, CAD, LBBB, dementia who presents with cough and congestion.     Plan:  - ECG with LBBB which is old  - Troponin elevated at 1.217 likely in the setting of PNA. Continue to trend.  - Continue aspirin 81 mg daily  - Check echocardiogram  - CT chest with multifocal PNA  - BNP elevated at [address] although no evidence of decompensated heart failure on exam  - Gentle hydration  - On zosyn and vancomycin  - Continue metoprolol tartrate 25 mg bid with holding parameters  - Pulm follow-up The patient is a 89 year old male with a history of HTN, HL, bladder cancer, CAD, LBBB, dementia who presents with cough and congestion.     Plan:  - ECG with LBBB which is old  - Troponin elevated at 1.217 likely in the setting of PNA. Continue to trend.  - Continue aspirin 81 mg daily  - Check echocardiogram  - CT chest with multifocal PNA  - BNP elevated at 9[address] although no evidence of decompensated heart failure on exam  - Gentle hydration  - On zosyn and vancomycin  - Continue metoprolol tartrate 25 mg bid with holding parameters  - Pulm follow-up    ADDENDUM:  - Patient went into rapid AF with aberrancy and intermittent PVCs. Will give an additional dose of metoprolol tartrate 25 mg PO and digoxin 0.25 mg PO now.

## 2019-05-29 NOTE — H&P ADULT - ASSESSMENT
89 yo male ,[facility] resident with hx of bladder cancer , bladder polyps , prostate cancer , BPH ,cataract ,obesity , urinary retention , HTN , CAD ,LBBB, HLD , PREDIABETES , DEPRESSION ,DEMENTIA presents to ED with cough and chest congestion x 2-3 days CTT showed LLL PNEUMONIA Admitted for septic workup and evaluation,send blood and urine cx,serial lactate levels,monitor vitals closley,ivfs hydration,monitor urine output and renal profile,iv abx initiated -started on zosyn in ER Pulmonology consult called Found to have LBBB on EKG ( known from  2013 ) ,cardiology consult called .Found to have BNP elevation ,but no PVC or signs of fluid overload Admit to monitored unit for cardiac monitoring, obtain echo to evaluate LVEF, monitor ins/outs, monitor renal profile and electrolytes closely, cardiology consult ,send serial bnp , O2 supply, serial chest xrays, monitor weights and oral intake of fluids, nutritionist consult Palliative care consult requested ,to discuss advance directives and complete MOLST Found to have troponin elevation ,likely related to renal insufficiency and demand ischemia 2/2 to pneumonia  Admitted  to telemetry unit for monitoring , send 3 sets of cardiac ensymes to rule out coronary event, obtain ECHO to evaluate LVEF, cardiology consult ,continue current management, O2 supply, anticoagulation plan as per cardiology consult

## 2019-05-29 NOTE — H&P ADULT - RS GEN PE MLT RESP DETAILS PC
diminished breath sounds, R/respirations non-labored/breath sounds equal/diminished breath sounds, L/good air movement/airway patent/rhonchi

## 2019-05-29 NOTE — H&P ADULT - GASTROINTESTINAL DETAILS
normal/no distention/no masses palpable/nontender/no bruit/soft/bowel sounds normal/no rebound tenderness

## 2019-05-29 NOTE — H&P ADULT - NEGATIVE CARDIOVASCULAR SYMPTOMS
no dyspnea on exertion/no peripheral edema/no chest pain/no paroxysmal nocturnal dyspnea/no claudication

## 2019-05-29 NOTE — CONSULT NOTE ADULT - SUBJECTIVE AND OBJECTIVE BOX
History of Present Illness: The patient is a 89 year old male with a history of HTN, HL, bladder cancer, CAD, LBBB, dementia who presents with cough and congestion. The patient is a poor historian. He has no complaints. As per notes, he has been having cough and congestion for the past 2-3 days.    Past Medical/Surgical History:  HTN, HL, bladder cancer, CAD, LBBB, dementia     Medications:  Home Medications:  aspirin 81 mg oral tablet, chewable: 81 milligram(s) orally once a day (28 May 2019 23:39)  atorvastatin 10 mg oral tablet: 1 tab(s) orally once a day (at bedtime) (28 May 2019 23:39)  memantine 5 mg oral tablet: 1 tab(s) orally 2 times a day (28 May 2019 23:39)  metoprolol tartrate 25 mg oral tablet: 1 tab(s) orally 2 times a day (28 May 2019 23:39)  see medication reconciliation sheet:    (28 May 2019 20:35)  Tab-A-Iman oral tablet: 1 tab(s) orally once a day (28 May 2019 23:39)  terazosin 5 mg oral capsule: 1 cap(s) orally once a day (at bedtime) (28 May 2019 23:39)  venlafaxine 37.5 mg oral tablet: 1 tab(s) orally once a day (28 May 2019 23:39)      Family History: Non-contributory family history of premature cardiovascular atherosclerotic disease    Social History: No tobacco, alcohol or drug use    Review of Systems:  General: No fevers, chills, weight loss or gain  Skin: No rashes, color changes  Cardiovascular: No chest pain, orthopnea  Respiratory: No shortness of breath, cough  Gastrointestinal: No nausea, abdominal pain  Genitourinary: No incontinence, pain with urination  Musculoskeletal: No pain, swelling, decreased range of motion  Neurological: No headache, weakness  Psychiatric: No depression, anxiety  Endocrine: No weight loss or gain, increased thirst  All other systems are comprehensively negative.    Physical Exam:  Vitals:        Vital Signs Last 24 Hrs  T(C): 36.5 (29 May 2019 07:51), Max: 37.3 (28 May 2019 20:24)  T(F): 97.7 (29 May 2019 07:51), Max: 99.2 (28 May 2019 20:24)  HR: 81 (29 May 2019 07:51) (81 - 102)  BP: 114/50 (29 May 2019 07:51) (88/68 - 118/51)  BP(mean): --  RR: 25 (29 May 2019 07:51) (18 - 28)  SpO2: 98% (29 May 2019 07:51) (91% - 98%)  General: NAD  HEENT: MMM  Neck: No JVD, no carotid bruit  Lungs: CTAB  CV: RRR, nl S1/S2, no M/R/G  Abdomen: S/NT/ND, +BS  Extremities: No LE edema, no cyanosis  Neuro: AAOx3, non-focal  Skin: No rash    Labs:                        9.8    13.57 )-----------( 121      ( 29 May 2019 06:47 )             29.3     05-29    133<L>  |  100  |  38<H>  ----------------------------<  122<H>  4.1   |  25  |  1.85<H>    Ca    8.5      29 May 2019 06:47    TPro  6.3  /  Alb  2.8<L>  /  TBili  1.2  /  DBili  x   /  AST  29  /  ALT  17  /  AlkPhos  63  05-29    CARDIAC MARKERS ( 29 May 2019 06:47 )  1.217 ng/mL / x     / x     / x     / x          PT/INR - ( 28 May 2019 21:05 )   PT: 15.3 sec;   INR: 1.39 ratio             ECG: NSR, LBBB, PVC

## 2019-05-29 NOTE — CONSULT NOTE ADULT - ASSESSMENT
PATIENT ALVARO BAKER [facility] [phone]  3/16/1929 DOA 2019 DR ELISSA BECKHAM    ASSESSMENT/RECOMMENDATIONS (A/R)     POSS ASP PNEUM   On zosun  Check procalc speech eval mrsa   CHF  Chcek echo   COPD   On BD  RESP   Target po 90-95%                TIME SPENT Over 55 minutes aggregate care time spent on encounter; activities included   direct pt care, counseling and/or coordinating care reviewing notes, lab data/ imaging , discussion with multidisciplinary team/ pt /family. Risks, benefits, alternatives  discussed in detail.

## 2019-05-29 NOTE — H&P ADULT - MUSCULOSKELETAL
details… detailed exam ROM intact/no joint erythema/no joint swelling/no joint warmth/no calf tenderness

## 2019-05-29 NOTE — H&P ADULT - PROBLEM SELECTOR PLAN 1
Admitted for septic workup and evaluation,send blood and urine cx,serial lactate levels,monitor vitals closley,ivfs hydration,monitor urine output and renal profile,iv abx initiated Admitted for septic workup and evaluation,send blood and urine cx,serial lactate levels,monitor vitals closley,ivfs hydration,monitor urine output and renal profile,iv abx initiated ,pulm and ID cons called

## 2019-05-29 NOTE — SWALLOW BEDSIDE ASSESSMENT ADULT - SWALLOW EVAL: DIAGNOSIS
1- functional oral management given puree, honey thick liquids, nectar thick liquids and thin liquid textures marked by adequate bolus collection, transfer and transport. 2- mild oral dysphagia given solids marked by prolonged mastication resulting in delayed oral preparation/AP transit with mild lingual residue remaining post swallow. pt is able to clear noted stasis given liquid wash. 3- mild pharyngeal dysphagia given solid, puree, honey thick liquid, nectar thick liquid and thin liquid textures marked by delayed swallow trigger and reduced hyolaryngeal excursion. baseline cough is noted to persist throughout today's evaluation, which disallows for formal r/o of penetration/aspiration via clinical assessment alone. recommend objective assessment (MBS) to formally assess swallow mechanism.

## 2019-05-29 NOTE — ED ADULT NURSE REASSESSMENT NOTE - NS ED NURSE REASSESS COMMENT FT1
EKG tech notified unit of change in patient rhythm converting to afib. Patient assessed and remains GCS 15, reports no discomfort. Cardiologist MD Horvath notified and made aware of event. Repeat 12 lead EKG completed as instructed. continue to monitor.

## 2019-05-29 NOTE — H&P ADULT - VASCULAR
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 A chaperone is not applicable. She is accompanied by no one.  services not used.        Quality    Smoking Cessation CI no tobacco use and did not provide intervention and counseling in regards to tobacco use.      History of Present Illness  She reported: Feeling tired (fatigue) ? Fe def. recent weight loss -10n lbs in 2 years and no recent weight gain.  Palpitations.-  Excessive sweating noho t flashes. No loss of hair from the head or body thinning.  History of papillary carcinoma of the thyroid gland. History of iodine 131    History of thyroid surgery    No lump or swelling in the neck and no swollen glands in the neck. No hoarseness. No dysphagia, no recent increase in bowel frequency, and no constipation. No tremor while on Rx.  No history of irradiation of the neck.      Review of Systems  Const: recent -10 lbs weight loss and Normal.   Allergy & Immunology: Normal.   Eyes: Normal.   ENT: Normal.   CV: Normal.   Resp: Normal.   Breast: Normal.   GI: Normal.   : Normal.   Endo: Normal.   Heme/Lymph: Normal.   Musc: Normal.   Neuro: Normal.   Psych: Normal.   Skin: Normal.       Allergies  Cephalexin TABS  Penicillins  Sulfa Drugs    Current Meds   1. Bystolic 5 MG Oral Tablet; TAKE 1 TABLET DAILY;   Therapy: 30Nov2010 to (Evaluate:17Sep2012)  Requested for: 19Jun2012 Recorded   2. Levothyroxine Sodium 150 MCG Oral Tablet; TAKE 1 TABLET DAILY MONDAY-SATURDAY   AND 0.5 TABLET ON SUNDAY alternating with 1 tablets Monday through Saturday   and 0 tablets on Sunday;   Therapy: 27Aug2009 to (Evaluate:[id number])  Requested for: 06Aug2018; Last   Rx:06Aug2018 Ordered   3. Lexapro 20 MG Oral Tablet; TAKE 1 TABLET BY MOUTH EVERY DAY;    Therapy: 02Mar2010 to  Requested for: 02Mar2010 Recorded   4. Prevacid 15 MG Oral Capsule Delayed Release; TAKE 1 CAPSULE DAILY;   Therapy: 28Mar2011 to  Requested for: 28Mar2011 Recorded   5. 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      Social History  Never smoker    Vitals  Vital Signs    Recorded: 14Aug2018 02:51PM   Height 5 ft 5 in   Weight 203 lb    BMI Calculated 33.78   BSA Calculated 1.99   Systolic 128   Diastolic 69   Temperature 96.8 F   Heart Rate 60     Physical Exam  Constitutional: alert.   Eyes: no eyelid swelling and no ptosis.   Neck: No thyroid tissue palpable.   Lymphatic: no lymphadenopathy.   Pulmonary: normal respiratory rate and effort. breath sounds clear to auscultation bilaterally.   Cardiovascular: normal rate and regular rhythm. no right carotid bruit right dorsalis pedis 2+ no left carotid bruit left dorsalis pedis 2+ edema was not present in the lower extremities.   Abdomen: soft and nontender. no hepatomegaly and no splenomegaly.   Neurologic: Deep tendon reflexes: 1+ left ankle jerk2+ right ankle jerk.      Results/Data    09Aug2016 02:09PM   TSH   TSH: <0.008 mcUnits/mL Abnormal Low Reference Range 0.350-5.000  T4, FREE   FREE T4: 1.6 ng/dl Abnormal High Reference Range 0.8-1.5  CALCIUM   CALCIUM: 9.4 mg/dl Reference Range 8.4-10.2  PHOSPHORUS   PHOSPHORUS: 4.0 mg/dl Reference Range 2.4-4.7  THYROGLOBULIN WITH REFLEX   THYROGLOBULIN ANTIBODY: <0.9 IU/ml Reference Range 0.0-4.0  THYROGLOBULIN: 0.1 ng/ml Abnormal Low Reference Range 1.2-35.0  [id number] 10:15AM   T4, FREE   FREE T4: 1.5 ng/dl Reference Range 0.8-1.5  TSH   TSH: 0.010 mcUnits/mL Abnormal Low  Reference Range 0.350-5.000  THYROGLOBULIN WITH REFLEX   THYROGLOBULIN ANTIBODY: <0.9 IU/ml Reference Range 0.0-4.0  THYROGLOBULIN: <0.1 ng/ml Abnormal Low Reference Range 1.2-35.0  [id number] 02:48PM   T4, FREE   FREE T4: 1.5 ng/dl Reference Range 0.8-1.5  TSH   TSH: 0.013 mcUnits/mL Abnormal Low Reference Range 0.350-5.000  Assessment  Papillary adenocarcinoma of thyroid (C73)  Hypothyroidism, 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  TSH.052 on 132/day-goal.1-.5- decrease to 125 daily and repeat thyroid functions  Latest thyroglobulin remains suppressed at less than 0.1, with negative antibodies  Ultrasound from 8/16 with stable 7 x 4 x 5 cm isoechoic nodule in the left thyroid bed- followup thyroid US.      Signatures   Electronically signed by : Maria E Garnica CMA; Aug 14 2018  2:51PM CST    Electronically signed by : XIOMARA JOSEPH M.D.; Sep 11 2018  9:24PM CST     Equal and normal pulses (carotid, femoral, dorsalis pedis)

## 2019-05-29 NOTE — SWALLOW BEDSIDE ASSESSMENT ADULT - ASR SWALLOW ASPIRATION MONITOR
fever/throat clearing/oral hygiene/cough/pneumonia/change of breathing pattern/position upright (89Y)/gurgly voice/upper respiratory infection

## 2019-05-29 NOTE — ED ADULT NURSE NOTE - NSIMPLEMENTINTERV_GEN_ALL_ED
Implemented All Universal Safety Interventions:  [place] to call system. Call bell, personal items and telephone within reach. Instruct patient to call for assistance. Room bathroom lighting operational. Non-slip footwear when patient is off stretcher. Physically safe environment: no spills, clutter or unnecessary equipment. Stretcher in lowest position, wheels locked, appropriate side rails in place.

## 2019-05-29 NOTE — H&P ADULT - NSICDXPASTSURGICALHX_GEN_ALL_CORE_FT
PAST SURGICAL HISTORY:  Bladder polyps removed    Chest wall symptom s/p reomval of shrapnel from right chest wall in 1952    H/O: hypertension     Prediabetes     S/P cataract surgery, left     S/P cystoscopy multiple cystoscopies over 10 years with removal of  bladder polyps    S/P tonsillectomy as a child

## 2019-05-30 ENCOUNTER — TRANSCRIPTION ENCOUNTER (OUTPATIENT)
Age: 84
End: 2019-05-30

## 2019-05-30 DIAGNOSIS — I48.91 UNSPECIFIED ATRIAL FIBRILLATION: ICD-10-CM

## 2019-05-30 LAB
ANION GAP SERPL CALC-SCNC: 11 MMOL/L — SIGNIFICANT CHANGE UP (ref 5–17)
BASOPHILS # BLD AUTO: 0.02 K/UL — SIGNIFICANT CHANGE UP (ref 0–0.2)
BASOPHILS NFR BLD AUTO: 0.2 % — SIGNIFICANT CHANGE UP (ref 0–2)
BUN SERPL-MCNC: 38 MG/DL — HIGH (ref 7–23)
CALCIUM SERPL-MCNC: 8.4 MG/DL — SIGNIFICANT CHANGE UP (ref 8.4–10.5)
CHLORIDE SERPL-SCNC: 105 MMOL/L — SIGNIFICANT CHANGE UP (ref 96–108)
CO2 SERPL-SCNC: 22 MMOL/L — SIGNIFICANT CHANGE UP (ref 22–31)
CREAT SERPL-MCNC: 1.66 MG/DL — HIGH (ref 0.5–1.3)
CULTURE RESULTS: NO GROWTH — SIGNIFICANT CHANGE UP
EOSINOPHIL # BLD AUTO: 0.08 K/UL — SIGNIFICANT CHANGE UP (ref 0–0.5)
EOSINOPHIL NFR BLD AUTO: 0.8 % — SIGNIFICANT CHANGE UP (ref 0–6)
GLUCOSE BLDC GLUCOMTR-MCNC: 100 MG/DL — HIGH (ref 70–99)
GLUCOSE BLDC GLUCOMTR-MCNC: 104 MG/DL — HIGH (ref 70–99)
GLUCOSE BLDC GLUCOMTR-MCNC: 91 MG/DL — SIGNIFICANT CHANGE UP (ref 70–99)
GLUCOSE BLDC GLUCOMTR-MCNC: 93 MG/DL — SIGNIFICANT CHANGE UP (ref 70–99)
GLUCOSE SERPL-MCNC: 88 MG/DL — SIGNIFICANT CHANGE UP (ref 70–99)
HAPTOGLOB SERPL-MCNC: 211 MG/DL — HIGH (ref 34–200)
HCT VFR BLD CALC: 28.3 % — LOW (ref 39–50)
HGB BLD-MCNC: 9.3 G/DL — LOW (ref 13–17)
IMM GRANULOCYTES NFR BLD AUTO: 0.8 % — SIGNIFICANT CHANGE UP (ref 0–1.5)
LYMPHOCYTES # BLD AUTO: 1.02 K/UL — SIGNIFICANT CHANGE UP (ref 1–3.3)
LYMPHOCYTES # BLD AUTO: 10.4 % — LOW (ref 13–44)
MCHC RBC-ENTMCNC: 29.2 PG — SIGNIFICANT CHANGE UP (ref 27–34)
MCHC RBC-ENTMCNC: 32.9 GM/DL — SIGNIFICANT CHANGE UP (ref 32–36)
MCV RBC AUTO: 89 FL — SIGNIFICANT CHANGE UP (ref 80–100)
MONOCYTES # BLD AUTO: 0.62 K/UL — SIGNIFICANT CHANGE UP (ref 0–0.9)
MONOCYTES NFR BLD AUTO: 6.3 % — SIGNIFICANT CHANGE UP (ref 2–14)
NEUTROPHILS # BLD AUTO: 8.02 K/UL — HIGH (ref 1.8–7.4)
NEUTROPHILS NFR BLD AUTO: 81.5 % — HIGH (ref 43–77)
NRBC # BLD: 0 /100 WBCS — SIGNIFICANT CHANGE UP (ref 0–0)
PLATELET # BLD AUTO: 119 K/UL — LOW (ref 150–400)
POTASSIUM SERPL-MCNC: 4.3 MMOL/L — SIGNIFICANT CHANGE UP (ref 3.5–5.3)
POTASSIUM SERPL-SCNC: 4.3 MMOL/L — SIGNIFICANT CHANGE UP (ref 3.5–5.3)
RAPID RVP RESULT: SIGNIFICANT CHANGE UP
RBC # BLD: 3.18 M/UL — LOW (ref 4.2–5.8)
RBC # FLD: 12.5 % — SIGNIFICANT CHANGE UP (ref 10.3–14.5)
SODIUM SERPL-SCNC: 138 MMOL/L — SIGNIFICANT CHANGE UP (ref 135–145)
SPECIMEN SOURCE: SIGNIFICANT CHANGE UP
WBC # BLD: 9.84 K/UL — SIGNIFICANT CHANGE UP (ref 3.8–10.5)
WBC # FLD AUTO: 9.84 K/UL — SIGNIFICANT CHANGE UP (ref 3.8–10.5)

## 2019-05-30 PROCEDURE — 74230 X-RAY XM SWLNG FUNCJ C+: CPT | Mod: 26

## 2019-05-30 PROCEDURE — 92611 MOTION FLUOROSCOPY/SWALLOW: CPT

## 2019-05-30 RX ADMIN — Medication 37.5 MILLIGRAM(S): at 13:31

## 2019-05-30 RX ADMIN — Medication 3 MILLILITER(S): at 07:38

## 2019-05-30 RX ADMIN — PIPERACILLIN AND TAZOBACTAM 25 GRAM(S): 4; .5 INJECTION, POWDER, LYOPHILIZED, FOR SOLUTION INTRAVENOUS at 06:19

## 2019-05-30 RX ADMIN — Medication 25 MILLIGRAM(S): at 17:38

## 2019-05-30 RX ADMIN — Medication 81 MILLIGRAM(S): at 13:30

## 2019-05-30 RX ADMIN — Medication 4 MILLIGRAM(S): at 22:41

## 2019-05-30 RX ADMIN — Medication 1 TABLET(S): at 22:41

## 2019-05-30 RX ADMIN — Medication 1200 MILLIGRAM(S): at 17:38

## 2019-05-30 RX ADMIN — Medication 1 TABLET(S): at 13:31

## 2019-05-30 RX ADMIN — MEMANTINE HYDROCHLORIDE 5 MILLIGRAM(S): 10 TABLET ORAL at 17:38

## 2019-05-30 RX ADMIN — MEMANTINE HYDROCHLORIDE 5 MILLIGRAM(S): 10 TABLET ORAL at 06:20

## 2019-05-30 RX ADMIN — Medication 1 TABLET(S): at 06:20

## 2019-05-30 RX ADMIN — PANTOPRAZOLE SODIUM 40 MILLIGRAM(S): 20 TABLET, DELAYED RELEASE ORAL at 10:21

## 2019-05-30 RX ADMIN — PIPERACILLIN AND TAZOBACTAM 25 GRAM(S): 4; .5 INJECTION, POWDER, LYOPHILIZED, FOR SOLUTION INTRAVENOUS at 17:40

## 2019-05-30 RX ADMIN — HEPARIN SODIUM 5000 UNIT(S): 5000 INJECTION INTRAVENOUS; SUBCUTANEOUS at 17:39

## 2019-05-30 RX ADMIN — Medication 1 TABLET(S): at 13:30

## 2019-05-30 RX ADMIN — Medication 1200 MILLIGRAM(S): at 06:20

## 2019-05-30 RX ADMIN — Medication 25 MILLIGRAM(S): at 06:19

## 2019-05-30 RX ADMIN — Medication 0.5 MILLIGRAM(S): at 21:05

## 2019-05-30 RX ADMIN — HEPARIN SODIUM 5000 UNIT(S): 5000 INJECTION INTRAVENOUS; SUBCUTANEOUS at 06:20

## 2019-05-30 RX ADMIN — ATORVASTATIN CALCIUM 10 MILLIGRAM(S): 80 TABLET, FILM COATED ORAL at 22:41

## 2019-05-30 RX ADMIN — Medication 0.5 MILLIGRAM(S): at 07:26

## 2019-05-30 NOTE — SWALLOW VFSS/MBS ASSESSMENT ADULT - COMMENTS
Pt was received in the radiology suite this AM awake and cooperative. Per charting, pt is a "89 yo male ,[facility] resident with hx of bladder cancer , bladder polyps , prostate cancer , BPH ,cataract ,obesity , urinary retention , HTN , CAD ,LBBB, HLD , PREDIABETES , DEPRESSION ,DEMENTIA presents to ED with cough and chest congestion x 2-3 days CTT showed LLL PNEUMONIA Admitted for septic workup and evaluation,send blood and urine cx,serial lactate levels,monitor vitals closley,ivfs hydration,monitor urine output and renal profile,iv abx initiated -started on zosyn in ER Pulmonology consult called Found to have LBBB on EKG ( known from  2013 ) ,cardiology consult called .Found to have BNP elevation ,but no PVC or signs of fluid overload Admit to monitored unit for cardiac monitoring, obtain echo to evaluate LVEF, monitor ins/outs, monitor renal profile and electrolytes closely, cardiology consult ,send serial bnp , O2 supply, serial chest xrays, monitor weights and oral intake of fluids, nutritionist consult Palliative care consult requested ,to discuss advance directives and complete MOLST Found to have troponin elevation ,likely related to renal insufficiency and demand ischemia 2/2 to pneumonia  Admitted  to telemetry unit for monitoring , send 3 sets of cardiac ensymes to rule out coronary event, obtain ECHO to evaluate LVEF, cardiology consult ,continue current management, O2 supply, anticoagulation plan as per cardiology consult". Recent chest CT revealed "Multifocal pneumonia predominantly involving the left lower lobe and lingula. "

## 2019-05-30 NOTE — SWALLOW VFSS/MBS ASSESSMENT ADULT - RECOMMENDED CONSISTENCY
IMPRESSIONS CONTINUED: sensation. 6- incorporation of compensatory strategies (i.e. chin tuck) were unsuccessful as pt unable to consistently implement in absence of clinician prompts.    RECOMMENDED CONSISTENCIES - dysphagia 3 with nectar thick liquids. liquids to be consumed via single, small cup sip ONLY.

## 2019-05-30 NOTE — PROGRESS NOTE ADULT - SUBJECTIVE AND OBJECTIVE BOX
Chief Complaint: Cough, congestion    Interval Events: No events overnight. No complaints.    Review of Systems:  General: No fevers, chills, weight loss or gain  Skin: No rashes, color changes  Cardiovascular: No chest pain, orthopnea  Respiratory: No shortness of breath, cough  Gastrointestinal: No nausea, abdominal pain  Genitourinary: No incontinence, pain with urination  Musculoskeletal: No pain, swelling, decreased range of motion  Neurological: No headache, weakness  Psychiatric: No depression, anxiety  Endocrine: No weight loss or gain, increased thirst  All other systems are comprehensively negative.    Physical Exam:  Vitals:        Vital Signs Last 24 Hrs  T(C): 36.9 (30 May 2019 05:51), Max: 37.1 (29 May 2019 18:49)  T(F): 98.4 (30 May 2019 05:51), Max: 98.8 (29 May 2019 18:49)  HR: 96 (30 May 2019 07:36) (72 - 113)  BP: 101/54 (30 May 2019 05:51) (95/62 - 114/56)  BP(mean): --  RR: 18 (30 May 2019 05:51) (18 - 25)  SpO2: 99% (30 May 2019 07:36) (94% - 99%)  General: NAD  HEENT: MMM  Neck: No JVD, no carotid bruit  Lungs: CTAB  CV: RRR, nl S1/S2, no M/R/G  Abdomen: S/NT/ND, +BS  Extremities: No LE edema, no cyanosis  Neuro: AAOx3, non-focal  Skin: No rash    Labs:                        9.3    9.84  )-----------( 119      ( 30 May 2019 08:01 )             28.3     05-29    133<L>  |  100  |  38<H>  ----------------------------<  122<H>  4.1   |  25  |  1.85<H>    Ca    8.5      29 May 2019 06:47    TPro  6.3  /  Alb  2.8<L>  /  TBili  1.2  /  DBili  x   /  AST  29  /  ALT  17  /  AlkPhos  63  05-29    CARDIAC MARKERS ( 29 May 2019 12:32 )  1.066 ng/mL / x     / x     / x     / x      CARDIAC MARKERS ( 29 May 2019 06:47 )  1.217 ng/mL / x     / x     / x     / x          PT/INR - ( 28 May 2019 21:05 )   PT: 15.3 sec;   INR: 1.39 ratio             Telemetry: AF -> sinus rhythm, PVCs

## 2019-05-30 NOTE — PROGRESS NOTE ADULT - ATTENDING COMMENTS
89 yo male ,[facility] resident with hx of bladder cancer , bladder polyps , prostate cancer , BPH ,cataract ,obesity , urinary retention , HTN , CAD ,LBBB, HLD , PREDIABETES , DEPRESSION ,DEMENTIA presents to ED with cough and chest congestion x 2-3 days CTT showed LLL PNEUMONIA Admitted for septic workup and evaluation,send blood and urine cx,serial lactate levels,monitor vitals closley,ivfs hydration,monitor urine output and renal profile,iv abx initiated -started on zosyn in ER Pulmonology consult called Found to have LBBB on EKG ( known from  2013 ) ,cardiology consult called .Found to have BNP elevation ,but no PVC or signs of fluid overload Admit to monitored unit for cardiac monitoring, obtain echo to evaluate LVEF, monitor ins/outs, monitor renal profile and electrolytes closely, cardiology consult ,send serial bnp , O2 supply, serial chest xrays, monitor weights and oral intake of fluids, nutritionist consult Palliative care consult requested ,to discuss advance directives and complete MOLST Found to have troponin elevation ,likely related to renal insufficiency and demand ischemia 2/2 to pneumonia  Admitted  to telemetry unit for monitoring , send 3 sets of cardiac ensymes to rule out coronary event, obtain ECHO to evaluate LVEF, cardiology consult ,continue current management, O2 supply, anticoagulation plan as per cardiology consult

## 2019-05-30 NOTE — DISCHARGE NOTE NURSING/CASE MANAGEMENT/SOCIAL WORK - NSDCDPATPORTLINK_GEN_ALL_CORE
You can access the [facility] Patient Portal, offered by [facility], by registering with the following website: [email or web address]

## 2019-05-30 NOTE — SWALLOW VFSS/MBS ASSESSMENT ADULT - DIAGNOSTIC IMPRESSIONS
1- mild oral dysphagia given puree, solids and honey thick liquids marked by adequate bolus collection with delayed bolus transfer and transport with trace-mild residue remaining in oral cavity post swallow. posterior loss to oropharynx noted. 2- moderate oral dysphagia given thick liquid and thin liquid textures marked by adequate collection with delayed bolus transfer and transport with posterior loss extending to hypopharynx across consistencies. 3- mild pharyngeal dysphagia given solid, puree and honey thick liquids marked by delayed swallow trigger, reduced tongue base propulsion, reduced epiglottic deflection and reduced hyolaryngeal excursion resulting in mild stasis at the base of tongue and valleculae. stasis noted to reduce with spontaneous subsequent swallow elicited by pt. no penetration/aspiration viewed. 4- moderate pharyngeal dysphagia given nectar thick liquids marked by delayed swallow trigger, reduced tongue base propulsion, reduced epiglottic deflection and reduced hyolaryngeal excursion resulting in mild stasis at base of tongue and valleculae. with one larger PO intake, pt demonstrates trace penetration with full retrieval. when advised to consume single, small cup sips, penetration is not reduplicated. no aspiration viewed. 5- moderate-severe pharyngeal dysphagia given thin liquids marked by delayed swallow trigger, reduced tongue base propulsion, reduced epiglottic deflection, reduced hyolaryngeal excursion and incomplete closure of the laryngeal vestibule resulting in trace penetration with and without retrieval during the swallow. pt with moderate stasis remaining at base of tongue, valleculae, lateral and posterior pharyngeal walls, pyriform sinuses and aryepiglottic folds. pt is not sensitive to residual stasis requiring clinician prompt to elicit secondary swallow, which subsequently results in secondary penetration with and without full retrieval. pt is not sensitive to observed penetration, suggestive of reduced laryngopharyngeal

## 2019-05-30 NOTE — PROGRESS NOTE ADULT - SUBJECTIVE AND OBJECTIVE BOX
Patient is a 89y Male whom presented to the hospital with ckd and gene   seen in am nad no fever   PAST MEDICAL & SURGICAL HISTORY:  Bladder cancer  Obesity: BMI = 30.2  LBBB (left bundle branch block): noted on  EKG   Bladder polyps  Prostate cancer: diagnosed in ; pt received radioactive seeds  BPH (benign prostatic hyperplasia)  HTN (hypertension)  Ureteric obstruction  Prediabetes  H/O: hypertension  S/P cataract surgery, left  Bladder polyps: removed  S/P cystoscopy: multiple cystoscopies over 10 years with removal of  bladder polyps  S/P tonsillectomy: as a child  Chest wall symptom: s/p reomval of shrapnel from right chest wall in       MEDICATIONS  (STANDING):  aspirin  chewable 81 milliGRAM(s) Oral daily  atorvastatin 10 milliGRAM(s) Oral at bedtime  buDESOnide   0.5 milliGRAM(s) Respule 0.5 milliGRAM(s) Inhalation two times a day  dextrose 5%. 1000 milliLiter(s) (50 mL/Hr) IV Continuous <Continuous>  dextrose 50% Injectable 12.5 Gram(s) IV Push once  dextrose 50% Injectable 25 Gram(s) IV Push once  dextrose 50% Injectable 25 Gram(s) IV Push once  doxazosin 4 milliGRAM(s) Oral at bedtime  guaiFENesin ER 1200 milliGRAM(s) Oral every 12 hours  heparin  Injectable 5000 Unit(s) SubCutaneous every 12 hours  insulin lispro (HumaLOG) corrective regimen sliding scale   SubCutaneous three times a day before meals  insulin lispro (HumaLOG) corrective regimen sliding scale   SubCutaneous at bedtime  lactobacillus acidophilus 1 Tablet(s) Oral every 8 hours  memantine 5 milliGRAM(s) Oral two times a day  metoprolol tartrate 25 milliGRAM(s) Oral two times a day  multivitamin 1 Tablet(s) Oral daily  pantoprazole    Tablet 40 milliGRAM(s) Oral before breakfast  piperacillin/tazobactam IVPB. 3.375 Gram(s) IV Intermittent every 12 hours  sodium chloride 0.9%. 1000 milliLiter(s) (50 mL/Hr) IV Continuous <Continuous>  venlafaxine 37.5 milliGRAM(s) Oral daily      Allergies    No Known Drug Allergies  seasonal allergies (Other)    Intolerances        SOCIAL HISTORY:  Denies ETOh,Smoking,     FAMILY HISTORY:      REVIEW OF SYSTEMS:    CONSTITUTIONAL: pos  weakness, no fevers or chills  RESPIRATORY: pos  cough, wheezing, hemoptysis; pos  shortness of breath  CARDIOVASCULAR: pos  chest pain or no palpitations  GASTROINTESTINAL: No abdominal or epigastric pain. No nausea, vomiting,     No diarrhea or constipation. No melena   GENITOURINARY: No dysuria, frequency or hematuria  NEUROLOGICAL: No numbness or weakness  SKIN: dry                          9.3    9.84  )-----------( 119      ( 30 May 2019 08:01 )             28.3       CBC Full  -  ( 30 May 2019 08:01 )  WBC Count : 9.84 K/uL  RBC Count : 3.18 M/uL  Hemoglobin : 9.3 g/dL  Hematocrit : 28.3 %  Platelet Count - Automated : 119 K/uL  Mean Cell Volume : 89.0 fl  Mean Cell Hemoglobin : 29.2 pg  Mean Cell Hemoglobin Concentration : 32.9 gm/dL  Auto Neutrophil # : 8.02 K/uL  Auto Lymphocyte # : 1.02 K/uL  Auto Monocyte # : 0.62 K/uL  Auto Eosinophil # : 0.08 K/uL  Auto Basophil # : 0.02 K/uL  Auto Neutrophil % : 81.5 %  Auto Lymphocyte % : 10.4 %  Auto Monocyte % : 6.3 %  Auto Eosinophil % : 0.8 %  Auto Basophil % : 0.2 %          138  |  105  |  38<H>  ----------------------------<  88  4.3   |  22  |  1.66<H>    Ca    8.4      30 May 2019 08:01    TPro  6.3  /  Alb  2.8<L>  /  TBili  1.2  /  DBili  x   /  AST  29  /  ALT  17  /  AlkPhos  63  05-29      CAPILLARY BLOOD GLUCOSE      POCT Blood Glucose.: 100 mg/dL (30 May 2019 12:49)  POCT Blood Glucose.: 91 mg/dL (30 May 2019 08:21)  POCT Blood Glucose.: 127 mg/dL (29 May 2019 21:25)  POCT Blood Glucose.: 119 mg/dL (29 May 2019 16:05)      Vital Signs Last 24 Hrs  T(C): 36.7 (30 May 2019 08:45), Max: 37.1 (29 May 2019 18:49)  T(F): 98.1 (30 May 2019 08:45), Max: 98.8 (29 May 2019 18:49)  HR: 80 (30 May 2019 10:14) (69 - 111)  BP: 124/62 (30 May 2019 08:45) (95/62 - 124/62)  BP(mean): --  RR: 20 (30 May 2019 08:45) (18 - 21)  SpO2: 95% (30 May 2019 10:14) (95% - 100%)    Urinalysis Basic - ( 29 May 2019 02:45 )    Color: Yellow / Appearance: Clear / S.020 / pH: x  Gluc: x / Ketone: Negative  / Bili: Negative / Urobili: Negative mg/dL   Blood: x / Protein: 30 mg/dL / Nitrite: Negative   Leuk Esterase: Negative / RBC: 0-2 /HPF / WBC Negative   Sq Epi: x / Non Sq Epi: Occasional / Bacteria: Few        PT/INR - ( 28 May 2019 21:05 )   PT: 15.3 sec;   INR: 1.39 ratio             PHYSICAL EXAM:    Constitutional: NAD  HEENT: conjunctive   clear   Neck:  No JVD  Respiratory: decrease bs b/l   Cardiovascular: S1 and S2  Gastrointestinal: BS+, soft, NT/ND  Extremities: No peripheral edema  Neurological:  no focal deficits  Psychiatric: Normal mood, normal affect  : No Howard  Skin: dry   Access: Not applicable

## 2019-05-30 NOTE — DISCHARGE NOTE NURSING/CASE MANAGEMENT/SOCIAL WORK - NSDCCRNAME_GEN_ALL_CORE_FT
[facility] [phone]/fax [phone]. Utilizes [facility] [phone].

## 2019-05-30 NOTE — SWALLOW VFSS/MBS ASSESSMENT ADULT - SPECIFY REASON(S)
to objectively assess swallow function. pt is familiar to this service from a clinical evaluation of swallow function completed yesterday 5/29/2019 (please see report for details)

## 2019-05-30 NOTE — PROGRESS NOTE ADULT - PROBLEM SELECTOR PLAN 1
ACUTE RENAL FAILURE: hold diuretics   continue with hydration will f/u with bun and cr is improving   Serum creatinine is stable at 1.66    , approximating GFR is decreased    ml/min.   There is  progression . No uremic symptoms    pos  evidence of anemia .  Fluid status stable.  Will continue to avoid nephrotoxic drugs.  Patient remains asymptomatic.   Continue current therapy.

## 2019-05-30 NOTE — PROGRESS NOTE ADULT - SUBJECTIVE AND OBJECTIVE BOX
Patient chart was reviewed Workup and management orders based on current assessment have been entered to expedite patient care  Nursing notified for stat orders as applicable Detailed note and further workup and management orders (if needed)  will be entered in the space below / MD order section after patient has been examined     Preliminary note below will be updated today as more data becomes available     NEEDLE OLIVIA [facility] [id number]  3/16/1929 DOA 2019 DR ELISSA DC  ALLERGY     nka   CONTACT     Datr  Mehnaz Vega [phone] self   Initial evaluation/Pulmonary Critical Care consultation requested on  2019   by Dr Dc  from Dr Obando   Patient examined chart reviewed    HOSPITAL ADMISSION [facility] DR ELISSA DC  PATIENT CAME  FROM (if information available)        TYPE OF VISIT      Subsequent Pulmonary followup     REASON FOR VISIT  For list of problems evaluated/addressed, please see problem list in the note below     PATIENT ALVARO BAKER [facility] [id number]  3/16/1929 DOA 2019 DR ELISSA DC    VITALS/LABS       2019 afeb 89 120/60  2019 W 9.8 Hb 9.3 Plt 119 Na 138 K 4.3 CO2 22 Cr 1.6     PATIENT ALVARO BAKER [facility] [id number]  3/16/1929 DOA 2019 DR ELISSA DC    PROBLEMS     PNEUMONIA   W 2019 W 14.4  C 2019 CT ch mf pneumesp lll and lingula  P  pr 29   M 2019 blood culture n   M 2019 mrsa p n  SA P positive   M  rvp n   CHF  B  bnp 62410  e 2019 echo EF 28% RVSP 45Severe as Mod mr   OLIVIA   C -2019   Cr 1.9 - 1.6  U  us renal No hydro    PATIENT  ALVARO BAKER [facility] [id number]  3/16/1929 DOA 2019 DR ELISSA DC    MEDICATIONS     CAD   Metoprolol 25.2 (2019)   ASA 81 (2019)   DVT P   hpsc (2019)   ASP PNEUM  zosyn (2019)   IV   NS 50 (2019)   DM   iss (2019)   COPD   Duoneb (2019)     GLOBAL ISSUE/BEST PRACTICE:      PROBLEM: HOB elevation:   y            PROBLEM: Stress ulcer proph:    na                      PROBLEM: VTE prophylaxis:       PROBLEM: Glycemic control:    na  PROBLEM: Nutrition:   Cons carb (2019)  PROBLEM: Advanced directive: na     PROBLEM: Allergies:  na    EVENT MBS 2019 dys 3 nectar recommended    REVIEW OF SYMPTOMS     NOTE Changes if any  in ROS and PE are updated in daily HOSPITAL COURSE below      Able to give ROS  Yes     RELIABLE No   CONSTITUTIONAL Weakness Yes  Chills No Vision changes No  ENDOCRINE No unexplained hair loss No heat or cold intolerance    ALLERGY No hives  Sore throat No   RESP Coughing blood no  Shortness of breath YES   NEURO No Headache  Confusion Pain neck No   CARDIAC No Chest pain No Palpitations   GI No Pain abdomen NO   Vomiting NO     PHYSICAL EXAM    HEENT Unremarkable PERRLA atraumatic   RESP Fair air entry EXP prolonged    Harsh breath sound Resp distres mild   CARDIAC S1 S2 No S3     NO JVD    ABDOMEN SOFT BS PRESENT NOT DISTENDED No hepatosplenomegaly PEDAL EDEMA present No calf tenderness  NO rash   GENERAL Not TOXIC looking

## 2019-05-30 NOTE — SWALLOW VFSS/MBS ASSESSMENT ADULT - NS SWALLOW VFSS REC ASPIR MON
oral hygiene/fever/pneumonia/upper respiratory infection/change of breathing pattern/position upright (89Y)/cough/gurgly voice/throat clearing

## 2019-05-30 NOTE — PROGRESS NOTE ADULT - PROBLEM SELECTOR PLAN 1
Admitted for septic workup and evaluation, send blood and urine cx ,serial lactate levels ,monitor vitals closely hydration, monitor urine output and renal profile ,iv abx initiated-on zosyn  ,pulm and ID consults  are appreciated

## 2019-05-30 NOTE — PROGRESS NOTE ADULT - SUBJECTIVE AND OBJECTIVE BOX
PROGRESS NOTE  Patient is a 89y old  Male who presents with a chief complaint of cough and sob (29 May 2019 15:01)    Chart and available morning labs /imaging are reviewed electronically , urgent issues addressed . More information  is being added upon completion of rounds , when more information is collected and management discussed with consultants , medical staff and social service/case management on the floor   OVERNIGHT  No new issues reported by medical staff . All above noted Patient is resting in a bed comfortably .Slept well ,denies cp ,sob  .No distress noted   Had rapid AF yesterday ,digoxin was given in ER   HPI:  89 yo male ,[facility] resident with hx of bladder cancer , bladder polyps , prostate cancer , BPH ,cataract ,obesity , urinary retention , HTN , CAD ,LBBB, HLD , PREDIABETES , DEPRESSION ,DEMENTIA presents to ED with cough and chest congestion x 2-3 days CTT showed LLL PNEUMONIA Admitted for septic workup and evaluation,send blood and urine cx,serial lactate levels,monitor vitals closley,ivfs hydration,monitor urine output and renal profile,iv abx initiated -started on zosyn in ER Pulmonology consult called Found to have LBBB on EKG ( known from   ) ,cardiology consult called .Found to have BNP elevation ,but no PVC or signs of fluid overload Admit to monitored unit for cardiac monitoring, obtain echo to evaluate LVEF, monitor ins/outs, monitor renal profile and electrolytes closely, cardiology consult ,send serial bnp , O2 supply, serial chest xrays, monitor weights and oral intake of fluids, nutritionist consult Palliative care consult requested ,to discuss advance directives and complete MOLST Found to have troponin elevation ,likely related to renal insufficiency and demand ischemia 2/2 to pneumonia  Admitted  to telemetry unit for monitoring , send 3 sets of cardiac ensymes to rule out coronary event, obtain ECHO to evaluate LVEF, cardiology consult ,continue current management, O2 supply, anticoagulation plan as per cardiology consult (29 May 2019 00:07)    PAST MEDICAL & SURGICAL HISTORY:  Bladder cancer  Obesity: BMI = 30.2  LBBB (left bundle branch block): noted on  EKG   Bladder polyps  Prostate cancer: diagnosed in ; pt received radioactive seeds  BPH (benign prostatic hyperplasia)  HTN (hypertension)  Ureteric obstruction  Prediabetes  H/O: hypertension  S/P cataract surgery, left  Bladder polyps: removed  S/P cystoscopy: multiple cystoscopies over 10 years with removal of  bladder polyps  S/P tonsillectomy: as a child  Chest wall symptom: s/p reomval of shrapnel from right chest wall in 195      MEDICATIONS  (STANDING):  aspirin  chewable 81 milliGRAM(s) Oral daily  atorvastatin 10 milliGRAM(s) Oral at bedtime  buDESOnide   0.5 milliGRAM(s) Respule 0.5 milliGRAM(s) Inhalation two times a day  dextrose 5%. 1000 milliLiter(s) (50 mL/Hr) IV Continuous <Continuous>  dextrose 50% Injectable 12.5 Gram(s) IV Push once  dextrose 50% Injectable 25 Gram(s) IV Push once  dextrose 50% Injectable 25 Gram(s) IV Push once  doxazosin 4 milliGRAM(s) Oral at bedtime  guaiFENesin ER 1200 milliGRAM(s) Oral every 12 hours  heparin  Injectable 5000 Unit(s) SubCutaneous every 12 hours  insulin lispro (HumaLOG) corrective regimen sliding scale   SubCutaneous three times a day before meals  insulin lispro (HumaLOG) corrective regimen sliding scale   SubCutaneous at bedtime  lactobacillus acidophilus 1 Tablet(s) Oral every 8 hours  memantine 5 milliGRAM(s) Oral two times a day  metoprolol tartrate 25 milliGRAM(s) Oral two times a day  multivitamin 1 Tablet(s) Oral daily  pantoprazole    Tablet 40 milliGRAM(s) Oral before breakfast  piperacillin/tazobactam IVPB. 3.375 Gram(s) IV Intermittent every 12 hours  venlafaxine 37.5 milliGRAM(s) Oral daily    MEDICATIONS  (PRN):  acetaminophen   Tablet .. 650 milliGRAM(s) Oral every 6 hours PRN Temp greater or equal to 38C (100.4F), Mild Pain (1 - 3)  ALBUTerol/ipratropium for Nebulization 3 milliLiter(s) Nebulizer every 6 hours PRN Shortness of Breath and/or Wheezing  dextrose 40% Gel 15 Gram(s) Oral once PRN Blood Glucose LESS THAN 70 milliGRAM(s)/deciliter  glucagon  Injectable 1 milliGRAM(s) IntraMuscular once PRN Glucose LESS THAN 70 milligrams/deciliter  guaiFENesin   Syrup  (Sugar-Free) 200 milliGRAM(s) Oral every 8 hours PRN Cough      OBJECTIVE    T(C): 36.9 (19 @ 05:51), Max: 37.1 (19 @ 18:49)  HR: 96 (19 @ 07:36) (72 - 113)  BP: 101/54 (19 @ 05:51) (95/62 - 114/56)  RR: 18 (19 @ 05:51) (18 - 25)  SpO2: 99% (19 @ 07:36) (94% - 99%)  Wt(kg): --  I&O's Summary    29 May 2019 07:01  -  30 May 2019 07:00  --------------------------------------------------------  IN: 550 mL / OUT: 1000 mL / NET: -450 mL          REVIEW OF SYSTEMS:  CONSTITUTIONAL: No fever, weight loss, or fatigue  EYES: No eye pain, visual disturbances, or discharge  ENMT:   No sinus or throat pain  NECK: No pain or stiffness  RESPIRATORY:  cough improving , denies wheezing, chills or hemoptysis; No shortness of breath  CARDIOVASCULAR: No chest pain, palpitations, dizziness, or leg swelling  GASTROINTESTINAL: No abdominal pain. No nausea, vomiting; No diarrhea or constipation. No melena or hematochezia.  GENITOURINARY: No dysuria, frequency, hematuria, or incontinence  NEUROLOGICAL: No headaches, memory loss, loss of strength, numbness, or tremors  SKIN: No itching, burning, rashes, or lesions   MUSCULOSKELETAL: No joint pain or swelling; No muscle, back, or extremity pain    PHYSICAL EXAM:  Appearance: NAD. VS past 24 hrs -as above   HEENT:   Moist oral mucosa. Conjunctiva clear b/l.   Neck : supple  Respiratory: Lungs CTAB.  Gastrointestinal:  Soft, nontender. No rebound. No rigidity. BS present	  Cardiovascular: RRR ,S1S2 present  Neurologic: Non-focal. Moving all extremities.  Extremities: No edema. No erythema. No calf tenderness.  Skin: No rashes, No ecchymoses, No cyanosis.	  wounds ,skin lesions-See skin assesment flow sheet   LABS:                        9.3    9.84  )-----------( 119      ( 30 May 2019 08:01 )             28.3         138  |  105  |  38<H>  ----------------------------<  88  4.3   |  22  |  1.66<H>    Ca    8.4      30 May 2019 08:01    TPro  6.3  /  Alb  2.8<L>  /  TBili  1.2  /  DBili  x   /  AST  29  /  ALT  17  /  AlkPhos  63      CAPILLARY BLOOD GLUCOSE      POCT Blood Glucose.: 91 mg/dL (30 May 2019 08:21)  POCT Blood Glucose.: 127 mg/dL (29 May 2019 21:25)  POCT Blood Glucose.: 119 mg/dL (29 May 2019 16:05)  POCT Blood Glucose.: 108 mg/dL (29 May 2019 12:00)    PT/INR - ( 28 May 2019 21:05 )   PT: 15.3 sec;   INR: 1.39 ratio           Urinalysis Basic - ( 29 May 2019 02:45 )    Color: Yellow / Appearance: Clear / S.020 / pH: x  Gluc: x / Ketone: Negative  / Bili: Negative / Urobili: Negative mg/dL   Blood: x / Protein: 30 mg/dL / Nitrite: Negative   Leuk Esterase: Negative / RBC: 0-2 /HPF / WBC Negative   Sq Epi: x / Non Sq Epi: Occasional / Bacteria: Few        RADIOLOGY & ADDITIONAL TESTS:< from: US Kidney and Bladder (19 @ 11:43) >  EXAM:  US KIDNEYS AND BLADDER                                  PROCEDURE DATE:  2019          INTERPRETATION:  CLINICAL INFORMATION: OLIVIA    COMPARISON: No prior kidney or bladder sonogram examinations are   available for comparison. Comparison is made with a prior abdomen and   pelvis CT examination dated 2016.    TECHNIQUE: Sonography of the kidneys and bladder.     FINDINGS: The kidney parenchyma appears slightly echogenic bilaterally.    Right kidney:  11 cm. No renal mass, hydronephrosis or calculi.    Left kidney:  11.3 cm. Cysts of the left kidney within the upper and   lower poles, the largest of which measures 4.4 cm off the lower pole.    Urinary bladder: There is borderline urinary bladder wall thickening. The   prevoid urinary bladder volume dimensions are 7.2 x 6.7 x 7.4 cm. The   corresponding volume is 187 cc. The bilateral ureteral jets are   visualized with color imaging. A post void urinary bladder volume   residual was not calculated.    IMPRESSION: Slightly echogenic kidney parenchyma bilaterally which may be   compatible with medical renal disease.    No hydronephrosis. Left-sided renal cysts.    Borderline urinary bladder wall thickening. Correlate with urinalysis for   the possibility of cystitis.    < end of copied text >  < from: US Duplex Venous Lower Ext Complete, Bilateral (19 @ 07:46) >  EXAM:  US DPLX LWR EXT VEINS COMPL BI                                  PROCEDURE DATE:  2019          INTERPRETATION:  CLINICAL STATEMENT: Swelling leg.    TECHNIQUE: Ultrasound of bilateral lower extremity deep venous system.    COMPARISON: None.    FINDINGS:  There is color and spectral flow, compression and augmentation of the   common femoral, superficial femoral and popliteal veins.    There is flow in the posterior tibial vein.    IMPRESSION:  No evidence of DVT.      < end of copied text >     reviewed elctronically  ASSESSMENT/PLAN:

## 2019-05-30 NOTE — SWALLOW VFSS/MBS ASSESSMENT ADULT - ORAL PHASE
Delayed oral transit time/Reduced anterior - posterior transport/Residue in oral cavity/Uncontrolled bolus / spillover in tammi-pharynx Delayed oral transit time/Reduced anterior - posterior transport/Residue in oral cavity/Uncontrolled bolus / spillover in hypopharynx

## 2019-05-30 NOTE — PROGRESS NOTE ADULT - SUBJECTIVE AND OBJECTIVE BOX
ID Progress note     Name: OLIVIA JOHN  Age: 89y  Gender: Male  MRN: [id number]    Interval History-- Events noted , doing well, afebrile . Scheduled for MBS . Looks well . No cough   Notes reviewed    Past Medical History--  Bladder cancer  Obesity  LBBB (left bundle branch block)  Bladder polyps  Prostate cancer  BPH (benign prostatic hyperplasia)  HTN (hypertension)  Ureteric obstruction  Prediabetes  H/O: hypertension  S/P cataract surgery, left  Bladder polyps  S/P cystoscopy  S/P tonsillectomy  Chest wall symptom      For details regarding the patient's social history, family history, and other miscellaneous elements, please refer the initial infectious diseases consultation and/or the admitting history and physical examination for this admission.    Allergies--  Allergies    No Known Drug Allergies  seasonal allergies (Other)    Intolerances        Medications--  Antibiotics:  piperacillin/tazobactam IVPB. 3.375 Gram(s) IV Intermittent every 12 hours    Immunologic:    Other:  acetaminophen   Tablet .. PRN  ALBUTerol/ipratropium for Nebulization PRN  aspirin  chewable  atorvastatin  buDESOnide   0.5 milliGRAM(s) Respule  dextrose 40% Gel PRN  dextrose 5%.  dextrose 50% Injectable  dextrose 50% Injectable  dextrose 50% Injectable  doxazosin  glucagon  Injectable PRN  guaiFENesin   Syrup  (Sugar-Free) PRN  guaiFENesin ER  heparin  Injectable  insulin lispro (HumaLOG) corrective regimen sliding scale  insulin lispro (HumaLOG) corrective regimen sliding scale  lactobacillus acidophilus  memantine  metoprolol tartrate  multivitamin  pantoprazole    Tablet  venlafaxine      Review of Systems--  Review of systems unable to be obtained secondary to clinical condition.    Physical Examination--    Vital Signs: T(F): 98.4 (05-30-19 @ 05:51), Max: 98.8 (05-29-19 @ 18:49)  HR: 80 (05-30-19 @ 10:14)  BP: 101/54 (05-30-19 @ 05:51)  RR: 18 (05-30-19 @ 05:51)  SpO2: 95% (05-30-19 @ 10:14)  Wt(kg): --  General: Nontoxic-appearing Male in no acute distress.  HEENT: AT/NC. PERRL. EOMI. Anicteric. Conjunctiva pink and moist. Oropharynx clear. Dentition fair.  Neck: Not rigid. No sense of mass.  Nodes: None palpable.  Lungs: Coarse breath sounds  bilaterally without rales, wheezing or rhonchi  Heart: Regular rate and rhythm. No Murmur. No rub. No gallop. No palpable thrill.  Abdomen: Bowel sounds present and normoactive. Soft. Nondistended. Nontender. No sense of mass. No organomegaly.  Back: No spinal tenderness. No costovertebral angle tenderness.   Extremities: No cyanosis or clubbing. No edema.   Skin: Warm. Dry. Good turgor. No rash. No vasculitic stigmata.  Psychiatric: Appropriate affect and mood for situation.         Laboratory Studies--  CBC                        9.3    9.84  )-----------( 119      ( 30 May 2019 08:01 )             28.3       Chemistries  05-30    138  |  105  |  38<H>  ----------------------------<  88  4.3   |  22  |  1.66<H>    Ca    8.4      30 May 2019 08:01    TPro  6.3  /  Alb  2.8<L>  /  TBili  1.2  /  DBili  x   /  AST  29  /  ALT  17  /  AlkPhos  63  05-29    Procalcitonin, Serum (05.29.19 @ 11:33)    Procalcitonin, Serum: 29.80:     Lactate, Blood: 1.6 mmol/L (05-28-19 @ 21:05)    CARDIAC MARKERS ( 29 May 2019 12:32 )  1.066 ng/mL / x     / x     / x     / x      CARDIAC MARKERS ( 29 May 2019 06:47 )  1.217 ng/mL / x     / x     / x     / x        Serum Pro-Brain Natriuretic Peptide (05.28.19 @ 21:05)    Serum Pro-Brain Natriuretic Peptide: 01279 pg/mL      Culture Data  MRSA/MSSA PCR (05.29.19 @ 16:25)    MRSA PCR Result.: NotDetec:    Staph Aureus PCR Result: Detected        Recent Cultures:      Radiology:  US Kidney and Bladder (05.29.19 @ 11:43) >  FINDINGS: The kidney parenchyma appears slightly echogenic bilaterally.    Right kidney:  11 cm. No renal mass, hydronephrosis or calculi.    Left kidney:  11.3 cm. Cysts of the left kidney within the upper and   lower poles, the largest of which measures 4.4 cm off the lower pole.    Urinary bladder: There is borderline urinary bladder wall thickening. The   prevoid urinary bladder volume dimensions are 7.2 x 6.7 x 7.4 cm. The   corresponding volume is 187 cc. The bilateral ureteral jets are   visualized with color imaging. A post void urinary bladder volume   residual was not calculated.    IMPRESSION: Slightly echogenic kidney parenchyma bilaterally which may be   compatible with medical renal disease.    No hydronephrosis. Left-sided renal cysts.    Borderline urinary bladder wall thickening. Correlate with urinalysis for   the possibility of cystitis.    US Duplex Venous Lower Ext Complete, Bilateral (05.29.19 @ 07:46) >  FINDINGS:  There is color and spectral flow, compression and augmentation of the   common femoral, superficial femoral and popliteal veins.    There is flow in the posterior tibial vein.    IMPRESSION:  No evidence of DVT.      CT Chest No Cont (05.28.19 @ 21:50) >  FINDINGS:    CHEST:     LUNGS AND LARGE AIRWAYS: Patent central airways. Mild emphysema. Patchy   left lower lobe and lingular nodular airspace opacities compatible with   pneumonia. Dependent atelectasis at the lung bases. Tree-in-bud nodules   scattered throughout the lower lobes and lingula..  PLEURA: No pleural effusion.  VESSELS: Coronary artery calcifications. Atherosclerotic calcifications   of the aorta and great vessels.  HEART: Heart size is normal.No pericardial effusion.  MEDIASTINUM AND MITCH: No lymphadenopathy.  CHEST WALL AND LOWER NECK: Within normal limits.  VISUALIZED UPPER ABDOMEN: Upper lobe renal cyst.  BONES: Degenerative changes of the spine.    IMPRESSION: Multifocal pneumonia predominantly involving the left lower   lobe and lingula.          Assessment--  89 yo male ,[facility] resident with hx of bladder cancer , bladder polyps , prostate cancer , BPH ,cataract ,obesity , urinary retention , HTN , CAD ,LBBB, HLD , PREDIABETES , DEPRESSION ,DEMENTIA presents to ED with cough and chest congestion x 2-3 days CTT showed LLL Nodular and patchy pneumonia , as he has oropharyngeal dysphagia likely its is aspiration pneumonia . He is afebrile and does not have significant leukocytosis.    He could have CHF exacerbation , he has elevated troponin and PBNP     Plan :   - will follow cs results , till then  continue with Zosyn   - Vancomycin dc as MRSA screen negative   - follow MBS and echo   - aspiration precautions   - PT evaluation     Continue with present regime .  Appropriate use of antibiotics and adverse effects reviewed.    I have discussed the above plan of care with patient and his family in detail. They expressed understanding of the treatment plan . Risks, benefits and alternatives discussed in detail. I have asked if they have any questions or concerns and appropriately addressed them to the best of my ability . He is DNR and DNI has a MOLST form       > 25 minutes spent in direct patient care reviewing  the notes, lab data/ imaging , discussion with multidisciplinary team. All questions were addressed and answered to the best of my capacity .    Thank you for allowing me to participate in the care of your patient .        Cristopher Franklin MD  [phone]

## 2019-05-30 NOTE — SWALLOW VFSS/MBS ASSESSMENT ADULT - RECOMMENDED FEEDING/EATING TECHNIQUES
hard swallow w/ each bite or sip/no straws/oral hygiene/provide rest periods between swallows/allow for swallow between intakes/alternate food with liquid/maintain upright posture during/after eating for 30 mins/position upright (90 degrees)/small sips/bites

## 2019-05-31 ENCOUNTER — TRANSCRIPTION ENCOUNTER (OUTPATIENT)
Age: 84
End: 2019-05-31

## 2019-05-31 DIAGNOSIS — R41.0 DISORIENTATION, UNSPECIFIED: ICD-10-CM

## 2019-05-31 DIAGNOSIS — I50.9 HEART FAILURE, UNSPECIFIED: ICD-10-CM

## 2019-05-31 LAB
ALBUMIN SERPL ELPH-MCNC: 2.8 G/DL — LOW (ref 3.3–5)
ALP SERPL-CCNC: 64 U/L — SIGNIFICANT CHANGE UP (ref 30–120)
ALT FLD-CCNC: 22 U/L DA — SIGNIFICANT CHANGE UP (ref 10–60)
ANION GAP SERPL CALC-SCNC: 11 MMOL/L — SIGNIFICANT CHANGE UP (ref 5–17)
AST SERPL-CCNC: 24 U/L — SIGNIFICANT CHANGE UP (ref 10–40)
BASOPHILS # BLD AUTO: 0.04 K/UL — SIGNIFICANT CHANGE UP (ref 0–0.2)
BASOPHILS NFR BLD AUTO: 0.4 % — SIGNIFICANT CHANGE UP (ref 0–2)
BILIRUB SERPL-MCNC: 1.1 MG/DL — SIGNIFICANT CHANGE UP (ref 0.2–1.2)
BUN SERPL-MCNC: 34 MG/DL — HIGH (ref 7–23)
CALCIUM SERPL-MCNC: 8.9 MG/DL — SIGNIFICANT CHANGE UP (ref 8.4–10.5)
CHLORIDE SERPL-SCNC: 106 MMOL/L — SIGNIFICANT CHANGE UP (ref 96–108)
CO2 SERPL-SCNC: 24 MMOL/L — SIGNIFICANT CHANGE UP (ref 22–31)
CREAT SERPL-MCNC: 1.55 MG/DL — HIGH (ref 0.5–1.3)
EOSINOPHIL # BLD AUTO: 0.06 K/UL — SIGNIFICANT CHANGE UP (ref 0–0.5)
EOSINOPHIL NFR BLD AUTO: 0.6 % — SIGNIFICANT CHANGE UP (ref 0–6)
GLUCOSE BLDC GLUCOMTR-MCNC: 104 MG/DL — HIGH (ref 70–99)
GLUCOSE BLDC GLUCOMTR-MCNC: 104 MG/DL — HIGH (ref 70–99)
GLUCOSE BLDC GLUCOMTR-MCNC: 77 MG/DL — SIGNIFICANT CHANGE UP (ref 70–99)
GLUCOSE BLDC GLUCOMTR-MCNC: 92 MG/DL — SIGNIFICANT CHANGE UP (ref 70–99)
GLUCOSE SERPL-MCNC: 77 MG/DL — SIGNIFICANT CHANGE UP (ref 70–99)
HCT VFR BLD CALC: 28.6 % — LOW (ref 39–50)
HGB BLD-MCNC: 9.6 G/DL — LOW (ref 13–17)
IMM GRANULOCYTES NFR BLD AUTO: 0.5 % — SIGNIFICANT CHANGE UP (ref 0–1.5)
INR BLD: 1.12 RATIO — SIGNIFICANT CHANGE UP (ref 0.88–1.16)
LEGIONELLA AG UR QL: NEGATIVE — SIGNIFICANT CHANGE UP
LYMPHOCYTES # BLD AUTO: 0.98 K/UL — LOW (ref 1–3.3)
LYMPHOCYTES # BLD AUTO: 10.1 % — LOW (ref 13–44)
MCHC RBC-ENTMCNC: 30.4 PG — SIGNIFICANT CHANGE UP (ref 27–34)
MCHC RBC-ENTMCNC: 33.6 GM/DL — SIGNIFICANT CHANGE UP (ref 32–36)
MCV RBC AUTO: 90.5 FL — SIGNIFICANT CHANGE UP (ref 80–100)
MONOCYTES # BLD AUTO: 0.66 K/UL — SIGNIFICANT CHANGE UP (ref 0–0.9)
MONOCYTES NFR BLD AUTO: 6.8 % — SIGNIFICANT CHANGE UP (ref 2–14)
NEUTROPHILS # BLD AUTO: 7.88 K/UL — HIGH (ref 1.8–7.4)
NEUTROPHILS NFR BLD AUTO: 81.6 % — HIGH (ref 43–77)
NRBC # BLD: 0 /100 WBCS — SIGNIFICANT CHANGE UP (ref 0–0)
PLATELET # BLD AUTO: 128 K/UL — LOW (ref 150–400)
POTASSIUM SERPL-MCNC: 4.3 MMOL/L — SIGNIFICANT CHANGE UP (ref 3.5–5.3)
POTASSIUM SERPL-SCNC: 4.3 MMOL/L — SIGNIFICANT CHANGE UP (ref 3.5–5.3)
PROCALCITONIN SERPL-MCNC: 7.75 NG/ML — HIGH (ref 0.02–0.1)
PROT SERPL-MCNC: 6.5 G/DL — SIGNIFICANT CHANGE UP (ref 6–8.3)
PROTHROM AB SERPL-ACNC: 12.3 SEC — SIGNIFICANT CHANGE UP (ref 10–12.9)
RBC # BLD: 3.16 M/UL — LOW (ref 4.2–5.8)
RBC # FLD: 12.4 % — SIGNIFICANT CHANGE UP (ref 10.3–14.5)
S PNEUM AG SER QL: SIGNIFICANT CHANGE UP
SODIUM SERPL-SCNC: 141 MMOL/L — SIGNIFICANT CHANGE UP (ref 135–145)
WBC # BLD: 9.67 K/UL — SIGNIFICANT CHANGE UP (ref 3.8–10.5)
WBC # FLD AUTO: 9.67 K/UL — SIGNIFICANT CHANGE UP (ref 3.8–10.5)

## 2019-05-31 PROCEDURE — 71045 X-RAY EXAM CHEST 1 VIEW: CPT | Mod: 26

## 2019-05-31 PROCEDURE — 92526 ORAL FUNCTION THERAPY: CPT

## 2019-05-31 RX ORDER — ASPIRIN/CALCIUM CARB/MAGNESIUM 324 MG
81 TABLET ORAL
Qty: 2430 | Refills: 0
Start: 2019-05-31 | End: 2019-06-29

## 2019-05-31 RX ORDER — TERAZOSIN HYDROCHLORIDE 10 MG/1
1 CAPSULE ORAL
Qty: 30 | Refills: 0
Start: 2019-05-31 | End: 2019-06-29

## 2019-05-31 RX ORDER — ATORVASTATIN CALCIUM 80 MG/1
1 TABLET, FILM COATED ORAL
Qty: 0 | Refills: 0 | DISCHARGE

## 2019-05-31 RX ORDER — LACTOBACILLUS ACIDOPHILUS 100MM CELL
1 CAPSULE ORAL
Qty: 20 | Refills: 0
Start: 2019-05-31 | End: 2019-06-09

## 2019-05-31 RX ORDER — LACTOBACILLUS ACIDOPHILUS 100MM CELL
1 CAPSULE ORAL
Qty: 0 | Refills: 0 | DISCHARGE
Start: 2019-05-31

## 2019-05-31 RX ORDER — FUROSEMIDE 40 MG
20 TABLET ORAL DAILY
Refills: 0 | Status: DISCONTINUED | OUTPATIENT
Start: 2019-06-01 | End: 2019-06-03

## 2019-05-31 RX ORDER — ASPIRIN/CALCIUM CARB/MAGNESIUM 324 MG
81 TABLET ORAL
Qty: 0 | Refills: 0 | DISCHARGE

## 2019-05-31 RX ORDER — FUROSEMIDE 40 MG
20 TABLET ORAL ONCE
Refills: 0 | Status: COMPLETED | OUTPATIENT
Start: 2019-05-31 | End: 2019-05-31

## 2019-05-31 RX ORDER — FUROSEMIDE 40 MG
1 TABLET ORAL
Qty: 14 | Refills: 0
Start: 2019-05-31 | End: 2019-06-13

## 2019-05-31 RX ORDER — ACETAMINOPHEN 500 MG
2 TABLET ORAL
Qty: 80 | Refills: 0
Start: 2019-05-31 | End: 2019-06-09

## 2019-05-31 RX ORDER — VENLAFAXINE HCL 75 MG
1 CAPSULE, EXT RELEASE 24 HR ORAL
Qty: 30 | Refills: 0
Start: 2019-05-31 | End: 2019-06-29

## 2019-05-31 RX ORDER — ACETAMINOPHEN 500 MG
2 TABLET ORAL
Qty: 0 | Refills: 0 | DISCHARGE
Start: 2019-05-31

## 2019-05-31 RX ORDER — METOPROLOL TARTRATE 50 MG
1 TABLET ORAL
Qty: 60 | Refills: 0
Start: 2019-05-31 | End: 2019-06-29

## 2019-05-31 RX ORDER — ATORVASTATIN CALCIUM 80 MG/1
1 TABLET, FILM COATED ORAL
Qty: 30 | Refills: 0
Start: 2019-05-31 | End: 2019-06-29

## 2019-05-31 RX ORDER — TERAZOSIN HYDROCHLORIDE 10 MG/1
1 CAPSULE ORAL
Qty: 0 | Refills: 0 | DISCHARGE

## 2019-05-31 RX ORDER — PANTOPRAZOLE SODIUM 20 MG/1
1 TABLET, DELAYED RELEASE ORAL
Qty: 30 | Refills: 0
Start: 2019-05-31 | End: 2019-06-29

## 2019-05-31 RX ORDER — HALOPERIDOL DECANOATE 100 MG/ML
0.5 INJECTION INTRAMUSCULAR EVERY 6 HOURS
Refills: 0 | Status: DISCONTINUED | OUTPATIENT
Start: 2019-05-31 | End: 2019-06-03

## 2019-05-31 RX ORDER — PANTOPRAZOLE SODIUM 20 MG/1
1 TABLET, DELAYED RELEASE ORAL
Qty: 0 | Refills: 0 | DISCHARGE
Start: 2019-05-31

## 2019-05-31 RX ORDER — MEMANTINE HYDROCHLORIDE 10 MG/1
1 TABLET ORAL
Qty: 60 | Refills: 0
Start: 2019-05-31 | End: 2019-06-29

## 2019-05-31 RX ORDER — METOPROLOL TARTRATE 50 MG
1 TABLET ORAL
Qty: 0 | Refills: 0 | DISCHARGE

## 2019-05-31 RX ORDER — MEMANTINE HYDROCHLORIDE 10 MG/1
1 TABLET ORAL
Qty: 0 | Refills: 0 | DISCHARGE

## 2019-05-31 RX ORDER — VENLAFAXINE HCL 75 MG
1 CAPSULE, EXT RELEASE 24 HR ORAL
Qty: 0 | Refills: 0 | DISCHARGE

## 2019-05-31 RX ADMIN — Medication 1 TABLET(S): at 06:33

## 2019-05-31 RX ADMIN — Medication 1200 MILLIGRAM(S): at 17:18

## 2019-05-31 RX ADMIN — Medication 81 MILLIGRAM(S): at 11:34

## 2019-05-31 RX ADMIN — Medication 20 MILLIGRAM(S): at 14:27

## 2019-05-31 RX ADMIN — Medication 0.5 MILLIGRAM(S): at 18:36

## 2019-05-31 RX ADMIN — Medication 0.5 MILLIGRAM(S): at 07:44

## 2019-05-31 RX ADMIN — MEMANTINE HYDROCHLORIDE 5 MILLIGRAM(S): 10 TABLET ORAL at 06:33

## 2019-05-31 RX ADMIN — Medication 25 MILLIGRAM(S): at 17:18

## 2019-05-31 RX ADMIN — ATORVASTATIN CALCIUM 10 MILLIGRAM(S): 80 TABLET, FILM COATED ORAL at 21:53

## 2019-05-31 RX ADMIN — Medication 25 MILLIGRAM(S): at 06:33

## 2019-05-31 RX ADMIN — Medication 1 TABLET(S): at 11:34

## 2019-05-31 RX ADMIN — PIPERACILLIN AND TAZOBACTAM 25 GRAM(S): 4; .5 INJECTION, POWDER, LYOPHILIZED, FOR SOLUTION INTRAVENOUS at 06:32

## 2019-05-31 RX ADMIN — Medication 37.5 MILLIGRAM(S): at 11:34

## 2019-05-31 RX ADMIN — MEMANTINE HYDROCHLORIDE 5 MILLIGRAM(S): 10 TABLET ORAL at 17:18

## 2019-05-31 RX ADMIN — Medication 1 TABLET(S): at 21:53

## 2019-05-31 RX ADMIN — Medication 1 TABLET(S): at 17:18

## 2019-05-31 RX ADMIN — Medication 1200 MILLIGRAM(S): at 06:33

## 2019-05-31 RX ADMIN — PANTOPRAZOLE SODIUM 40 MILLIGRAM(S): 20 TABLET, DELAYED RELEASE ORAL at 06:33

## 2019-05-31 RX ADMIN — Medication 4 MILLIGRAM(S): at 21:53

## 2019-05-31 RX ADMIN — Medication 1 TABLET(S): at 13:00

## 2019-05-31 RX ADMIN — HEPARIN SODIUM 5000 UNIT(S): 5000 INJECTION INTRAVENOUS; SUBCUTANEOUS at 17:18

## 2019-05-31 RX ADMIN — HEPARIN SODIUM 5000 UNIT(S): 5000 INJECTION INTRAVENOUS; SUBCUTANEOUS at 06:33

## 2019-05-31 NOTE — PROGRESS NOTE ADULT - SUBJECTIVE AND OBJECTIVE BOX
ID Progress note     Name: OLIVIA JOHN  Age: 89y  Gender: Male  MRN: [id number]    Interval History-- Events noted, doing well, afebrile . Had MBS showed positive aspiration, die changed to dysphagia 3 with nectar thick liquids . Afebrile doing well   Notes reviewed    Past Medical History--  Bladder cancer  Obesity  LBBB (left bundle branch block)  Bladder polyps  Prostate cancer  BPH (benign prostatic hyperplasia)  HTN (hypertension)  Ureteric obstruction  Prediabetes  H/O: hypertension  S/P cataract surgery, left  Bladder polyps  S/P cystoscopy  S/P tonsillectomy  Chest wall symptom      For details regarding the patient's social history, family history, and other miscellaneous elements, please refer the initial infectious diseases consultation and/or the admitting history and physical examination for this admission.    Allergies--  Allergies    No Known Drug Allergies  seasonal allergies (Other)    Intolerances        Medications--  Antibiotics:  piperacillin/tazobactam IVPB. 3.375 Gram(s) IV Intermittent every 12 hours    Immunologic:    Other:  acetaminophen   Tablet .. PRN  ALBUTerol/ipratropium for Nebulization PRN  aspirin  chewable  atorvastatin  buDESOnide   0.5 milliGRAM(s) Respule  dextrose 40% Gel PRN  dextrose 5%.  dextrose 50% Injectable  dextrose 50% Injectable  dextrose 50% Injectable  doxazosin  glucagon  Injectable PRN  guaiFENesin   Syrup  (Sugar-Free) PRN  guaiFENesin ER  heparin  Injectable  insulin lispro (HumaLOG) corrective regimen sliding scale  insulin lispro (HumaLOG) corrective regimen sliding scale  lactobacillus acidophilus  memantine  metoprolol tartrate  multivitamin  pantoprazole    Tablet  venlafaxine      Review of Systems--  Review of systems unable to be obtained secondary to clinical condition.    Physical Examination--    Vital Signs: T(F): 97.4 (05-31-19 @ 05:10), Max: 98.4 (05-30-19 @ 14:20)  HR: 67 (05-31-19 @ 05:10)  BP: 149/66 (05-31-19 @ 05:10)  RR: 19 (05-31-19 @ 05:10)  SpO2: 94% (05-31-19 @ 05:10)  Wt(kg): --  General: Nontoxic-appearing Male in no acute distress.  HEENT: AT/NC. PERRL. EOMI. Anicteric. Conjunctiva pink and moist. Oropharynx clear. Dentition fair.  Neck: Not rigid. No sense of mass.  Nodes: None palpable.  Lungs: Coarse breath sounds  bilaterally without rales, wheezing or rhonchi  Heart: Regular rate and rhythm. No Murmur. No rub. No gallop. No palpable thrill.  Abdomen: Bowel sounds present and normoactive. Soft. Nondistended. Nontender. No sense of mass. No organomegaly.  Back: No spinal tenderness. No costovertebral angle tenderness.   Extremities: No cyanosis or clubbing. No edema.   Skin: Warm. Dry. Good turgor. No rash. No vasculitic stigmata.  Psychiatric: Appropriate affect and mood for situation.         Laboratory Studies--  CBC                        9.3    9.84  )-----------( 119      ( 30 May 2019 08:01 )             28.3       Chemistries  05-30    138  |  105  |  38<H>  ----------------------------<  88  4.3   |  22  |  1.66<H>    Ca    8.4      30 May 2019 08:01    CARDIAC MARKERS ( 29 May 2019 12:32 )  1.066 ng/mL / x     / x     / x     / x      CARDIAC MARKERS ( 29 May 2019 06:47 )  1.217 ng/mL / x     / x     / x     / x        Serum Pro-Brain Natriuretic Peptide (05.28.19 @ 21:05)    Serum Pro-Brain Natriuretic Peptide: 02154 pg/mL    Culture Data    Culture - Blood (collected 29 May 2019 13:22)  Source: .Blood  Preliminary Report (30 May 2019 14:01):    No growth to date.    Culture - Blood (collected 29 May 2019 13:22)  Source: .Blood  Preliminary Report (30 May 2019 14:01):    No growth to date.    Culture - Urine (collected 29 May 2019 12:40)  Source: .Urine  Final Report (30 May 2019 10:53):    No growth          Radiology:  US Transthoracic Echocardiogram w/Doppler Complete (05.29.19 @ 09:01) >  LVEF: 28%  RVSP: 45 mmHg  RA Pressure:  IVC:    FINDINGS  Left Ventricle: Left ventricle is globally hypokinetic with marked   reduced left ventricular ejection fraction wall thickness is normal as   seen in this limited study  Right Ventricle: Right ventricle is normal  Left Atrium: Left atrium is markedly dilated  Right Atrium: Right atrium is normal  Mitral Valve: Mitral valve reveals moderate mitral regurgitation. Mitral   annular calcification is seen. Cannot exclude mild mitral stenosis.  Aortic Valve: Aortic valve is calcified. Peak gradient is 59 mm mean   gradient is 36. This is consistent with severe aortic stenosis  Tricuspid Valve: Tricuspid valve reveals mild to moderate tricuspid   regurgitation. Right-sided pressures are mildly elevated.  Pulmonic Valve: Pulmonic valve appears normal  Diastolic Function:  Pericardium/Pleura: No evidence of pericardial effusion      CONCLUSIONS:  Markedly reduced left ventricular systolic function. Moderate mitral   regurgitation. Mild mitral stenosis. Severe aortic stenosis. Dilated left   atrium. Mild to moderate tricuspid regurgitation. Mild elevation of   right-sided pressures. Technically limited study    CT Chest No Cont (05.28.19 @ 21:50) >  FINDINGS:    CHEST:     LUNGS AND LARGE AIRWAYS: Patent central airways. Mild emphysema. Patchy   left lower lobe and lingular nodular airspace opacities compatible with   pneumonia. Dependent atelectasis at the lung bases. Tree-in-bud nodules   scattered throughout the lower lobes and lingula..  PLEURA: No pleural effusion.  VESSELS: Coronary artery calcifications. Atherosclerotic calcifications   of the aorta and great vessels.  HEART: Heart size is normal.No pericardial effusion.  MEDIASTINUM AND MITCH: No lymphadenopathy.  CHEST WALL AND LOWER NECK: Within normal limits.  VISUALIZED UPPER ABDOMEN: Upper lobe renal cyst.  BONES: Degenerative changes of the spine.    IMPRESSION: Multifocal pneumonia predominantly involving the left lower   lobe and lingula.    Assessment--  89 yo male ,[facility] resident with hx of bladder cancer , bladder polyps , prostate cancer , BPH ,cataract ,obesity , urinary retention , HTN , CAD ,LBBB, HLD , PREDIABETES , DEPRESSION ,DEMENTIA presents to ED with cough and chest congestion x 2-3 days CTT showed LLL Nodular and patchy pneumonia , as he has oropharyngeal dysphagia likely its is aspiration pneumonia . He is afebrile and does not have significant leukocytosis.    He most likely had  CHF exacerbation , he has elevated troponin and PBNP , has severe LV dysfunction with EF 28 %    Plan :   - will change to po Augmentin 500 mg bid x 5 more days   - dc planning   - maximize chf meds   - aspiration precautions and dysphagis diet   - PT evaluation     Continue with present regime .  Appropriate use of antibiotics and adverse effects reviewed.    I have discussed the above plan of care with patient and family in detail. They expressed understanding of the treatment plan . Risks, benefits and alternatives discussed in detail. I have asked if they have any questions or concerns and appropriately addressed them to the best of my ability .      > 35 minutes spent in direct patient care reviewing  the notes, lab data/ imaging , discussion with multidisciplinary team. All questions were addressed and answered to the best of my capacity .    Thank you for allowing me to participate in the care of your patient .        Cristopher Franklin MD  [phone]

## 2019-05-31 NOTE — PROGRESS NOTE ADULT - PROBLEM SELECTOR PLAN 6
- Troponin peaked at 1.217 , No need to trend further as per card ,likely it is related  2/2 to renal insufficiency and demand ischemia due to pneumonia result of today's chest xray discussed with Dr Horvath on a phone > recommended to add 20 mg of lasix po daily . D/w Dr Obando and Dr Tellez .Anticipate d/c in the morning .ECHO reviewed .

## 2019-05-31 NOTE — DISCHARGE NOTE PROVIDER - CARE PROVIDERS DIRECT ADDRESSES
,DirectAddress_Unknown,DirectAddress_Unknown,[email or web address]

## 2019-05-31 NOTE — DISCHARGE NOTE PROVIDER - HOSPITAL COURSE
89 yo male ,[facility] resident with hx of bladder cancer , bladder polyps , prostate cancer , BPH ,cataract ,obesity , urinary retention , HTN , CAD ,LBBB, HLD , PREDIABETES , DEPRESSION ,DEMENTIA presents to ED with cough and chest congestion x 2-3 days CTT showed LLL PNEUMONIA Admitted for septic workup and evaluation,send blood and urine cx,serial lactate levels,monitor vitals closley,ivfs hydration,monitor urine output and renal profile,iv abx initiated -started on zosyn in ER Pulmonology consult called Found to have LBBB on EKG ( known from  2013 ) ,cardiology consult called .Found to have BNP elevation ,but no PVC or signs of fluid overload Admit to monitored unit for cardiac monitoring, obtain echo to evaluate LVEF, monitor ins/outs, monitor renal profile and electrolytes closely, cardiology consult ,send serial bnp , O2 supply, serial chest xrays, monitor weights and oral intake of fluids, nutritionist consult Palliative care consult requested ,to discuss advance directives and complete MOLST Found to have troponin elevation ,likely related to renal insufficiency and demand ischemia 2/2 to pneumonia  Admitted  to telemetry unit for monitoring , send 3 sets of cardiac ensymes to rule out coronary event, obtain ECHO to evaluate LVEF, cardiology consult ,continue current management, O2 supply, anticoagulation plan as per cardiology consult         Problem/Plan - 1:    ·  Problem: Pneumonia of lower lobe due to infectious organism.  Plan: SEEN BY ID and zosyn changed to po augmentin .CHECK O2 SAT ON ROOM AIR AT REST AND ON AMBULATION.     ·  Problem: Delirium.  Plan: HALDOL PRN.     ·  Problem: Atrial fibrillation.  Plan: - AF - brief episode yesterday. If recurrence, will consider adding anticoagulationas per card consult Monitor electrolytes ,check TSH.     ·  Problem: CAD (coronary artery disease).  Plan: continue current management and present  medications ,card eval is noted - ECG with LBBB which is old    - Troponin peaked at 1.217 likely in the setting of PNA. No need to trend further.    - Continue aspirin 81 mg daily    - Echocardiogram pending ,followed by card cons     - BNP elevated at [address] although no evidence of decompensated CHF as per card     - Continue metoprolol tartrate 25 mg bid    - AF - brief episode yesterday. If recurrence, will consider adding anticoagulation.     ·  Problem: HTN (hypertension).  Plan: continue home medications , tele monitoring.     Problem: Troponin I above reference range. Plan: - Troponin peaked at 1.217 , No need to trend further as per card ,likely it is related  2/2 to renal insufficiency and demand ischemia due to pneumonia.    ·  Problem: Renal insufficiency.  Plan: serial bmp , nephrology eval , ins/outs ,check renal us ,iv fluids stopped in view of chf hx.     ·  Problem: Prophylactic measure.  Plan: Gastrointestinal stress ulcer prophylaxis and DVT prophylaxis administrated.

## 2019-05-31 NOTE — PROGRESS NOTE ADULT - SUBJECTIVE AND OBJECTIVE BOX
Patient was examined Chart reviewed Detailed note will be inserted below after going over latest data Meanwhile please refer to my previous notes for Pulmonary/Critical Care assessment recommendations Patient was examined Chart reviewed Detailed note will be inserted below after going over latest data Meanwhile please refer to my previous notes for Pulmonary/Critical Care assessment recommendations     NEEDLE OLIVIA [facility] [place] [address]  3/16/1929 DOA 2019 DR ELISSA DC  ALLERGY     nka   CONTACT     Datr  Mehnaz Vega [phone] self   Initial evaluation/Pulmonary Critical Care consultation requested on  2019   by Dr Dc  from Dr Obando   Patient examined chart reviewed    HOSPITAL ADMISSION [facility] DR ELISSA DC  PATIENT CAME  FROM (if information available)        TYPE OF VISIT      Subsequent Pulmonary followup     REASON FOR VISIT  For list of problems evaluated/addressed, please see problem list in the note below     PATIENT ALVARO BAKER [facility] [place] [address]  3/16/1929 DOA 2019 DR ELISSA DC    VITALS/LABS       2019 afeb 83 140/67 14 99%   2019 W 9.6 Hb 9.6 Plt 128 Na 141 K 4.3 CO2 24 Cr 1.5     PATIENT ALVARO BAKER [facility] [place] [address]  3/16/1929 DOA 2019 DR ELISSA DC    PROBLEMS     PNEUMONIA   W 2019 W 14.4  C 2019 CT ch mf pneumesp lll and lingula  P - pr 29 - 7.7   X 5/31/2019 CXR persist mild pvc   M 2019 blood culture n   M 2019 mrsa p n  SA P positive   M  rvp n   CHF  B  bnp 43327  e 2019 echo EF 28% RVSP 45Severe as Mod mr   OLIVIA   C -2019   Cr 1.9 - 1.6  U  us renal No hydro    GLOBAL ISSUE/BEST PRACTICE:      PROBLEM: HOB elevation:   y            PROBLEM: Stress ulcer proph:    na                      PROBLEM: VTE prophylaxis:       PROBLEM: Glycemic control:    na  PROBLEM: Nutrition:   Cons carb (2019)  PROBLEM: Advanced directive: na     PROBLEM: Allergies:  na    EVENT MBS 2019 dys 3 nectar recommended    REVIEW OF SYMPTOMS     NOTE Changes if any  in ROS and PE are updated in daily HOSPITAL COURSE below      Able to give ROS  Yes     RELIABLE No   CONSTITUTIONAL Weakness Yes  Chills No Vision changes No  ENDOCRINE No unexplained hair loss No heat or cold intolerance    ALLERGY No hives  Sore throat No   RESP Coughing blood no  Shortness of breath YES   NEURO No Headache  Confusion Pain neck No   CARDIAC No Chest pain No Palpitations   GI No Pain abdomen NO   Vomiting NO     PHYSICAL EXAM    HEENT Unremarkable PERRLA atraumatic   RESP Fair air entry EXP prolonged    Harsh breath sound Resp distres mild   CARDIAC S1 S2 No S3     NO JVD    ABDOMEN SOFT BS PRESENT NOT DISTENDED No hepatosplenomegaly PEDAL EDEMA present No calf tenderness  NO rash   GENERAL Not TOXIC looking

## 2019-05-31 NOTE — PROGRESS NOTE ADULT - PROBLEM SELECTOR PLAN 1
ACUTE RENAL FAILURE: hold diuretics   continue with hydration will f/u with bun and cr is improving   Serum creatinine is stable at 1.55    , approximating GFR is increased    ml/min.   There is  progression . No uremic symptoms    pos  evidence of anemia .  Fluid status stable.  Will continue to avoid nephrotoxic drugs.  Patient remains asymptomatic.   Continue current therapy.

## 2019-05-31 NOTE — DISCHARGE NOTE PROVIDER - PROVIDER TOKENS
PROVIDER:[TOKEN:[[id number]],FOLLOWUP:[1-3 days]],PROVIDER:[TOKEN:[[id number]],FOLLOWUP:[2 weeks]],PROVIDER:[TOKEN:[[id number]],FOLLOWUP:[1 month]]

## 2019-05-31 NOTE — DISCHARGE NOTE PROVIDER - CARE PROVIDER_API CALL
Jamaal Pope)  Internal Medicine  [address], [address]  [place] [address]  Phone: [phone]  Fax: [phone]  Follow Up Time: 1-3 days    Stefan Horvath)  Cardiovascular Disease; Internal Medicine  [address], [address]  [place] [address]  Phone: [phone]  Fax: [phone]  Follow Up Time: 2 weeks    Pahlavan, Mohsen (MD)  Nephrology  [address], [address]  [place] [address]  Phone: [phone]  Fax: [phone]  Follow Up Time: 1 month

## 2019-05-31 NOTE — PROGRESS NOTE ADULT - PROBLEM SELECTOR PLAN 8
Gastrointestinal stress ulcer prophylaxis and DVT prophylaxis administrated serial bmp , nephrology eval , ins/outs ,check renal us ,iv fluids stopped in view of chf hx

## 2019-05-31 NOTE — PROGRESS NOTE ADULT - SUBJECTIVE AND OBJECTIVE BOX
Patient is a 89y Male whom presented to the hospital with ckd and gene   seen in am nad no fever , no chills    PAST MEDICAL & SURGICAL HISTORY:  Bladder cancer  Obesity: BMI = 30.2  LBBB (left bundle branch block): noted on  EKG 2013  Bladder polyps  Prostate cancer: diagnosed in 2003; pt received radioactive seeds  BPH (benign prostatic hyperplasia)  HTN (hypertension)  Ureteric obstruction  Prediabetes  H/O: hypertension  S/P cataract surgery, left  Bladder polyps: removed  S/P cystoscopy: multiple cystoscopies over 10 years with removal of  bladder polyps  S/P tonsillectomy: as a child  Chest wall symptom: s/p reomval of shrapnel from right chest wall in 1952      MEDICATIONS  (STANDING):  aspirin  chewable 81 milliGRAM(s) Oral daily  atorvastatin 10 milliGRAM(s) Oral at bedtime  buDESOnide   0.5 milliGRAM(s) Respule 0.5 milliGRAM(s) Inhalation two times a day  dextrose 5%. 1000 milliLiter(s) (50 mL/Hr) IV Continuous <Continuous>  dextrose 50% Injectable 12.5 Gram(s) IV Push once  dextrose 50% Injectable 25 Gram(s) IV Push once  dextrose 50% Injectable 25 Gram(s) IV Push once  doxazosin 4 milliGRAM(s) Oral at bedtime  guaiFENesin ER 1200 milliGRAM(s) Oral every 12 hours  heparin  Injectable 5000 Unit(s) SubCutaneous every 12 hours  insulin lispro (HumaLOG) corrective regimen sliding scale   SubCutaneous three times a day before meals  insulin lispro (HumaLOG) corrective regimen sliding scale   SubCutaneous at bedtime  lactobacillus acidophilus 1 Tablet(s) Oral every 8 hours  memantine 5 milliGRAM(s) Oral two times a day  metoprolol tartrate 25 milliGRAM(s) Oral two times a day  multivitamin 1 Tablet(s) Oral daily  pantoprazole    Tablet 40 milliGRAM(s) Oral before breakfast  piperacillin/tazobactam IVPB. 3.375 Gram(s) IV Intermittent every 12 hours  sodium chloride 0.9%. 1000 milliLiter(s) (50 mL/Hr) IV Continuous <Continuous>  venlafaxine 37.5 milliGRAM(s) Oral daily      Allergies    No Known Drug Allergies  seasonal allergies (Other)    Intolerances        SOCIAL HISTORY:  Denies ETOh,Smoking,     FAMILY HISTORY:      REVIEW OF SYSTEMS:    CONSTITUTIONAL: pos  weakness, no fevers or chills  RESPIRATORY: pos  cough, wheezing, hemoptysis; pos  shortness of breath  CARDIOVASCULAR: pos  chest pain or no palpitations  GASTROINTESTINAL: No abdominal or epigastric pain. No nausea, vomiting,     No diarrhea or constipation. No melena   GENITOURINARY: No dysuria, frequency or hematuria  NEUROLOGICAL: No numbness or weakness  SKIN: dry                                       9.6    9.67  )-----------( 128      ( 31 May 2019 08:13 )             28.6       CBC Full  -  ( 31 May 2019 08:13 )  WBC Count : 9.67 K/uL  RBC Count : 3.16 M/uL  Hemoglobin : 9.6 g/dL  Hematocrit : 28.6 %  Platelet Count - Automated : 128 K/uL  Mean Cell Volume : 90.5 fl  Mean Cell Hemoglobin : 30.4 pg  Mean Cell Hemoglobin Concentration : 33.6 gm/dL  Auto Neutrophil # : 7.88 K/uL  Auto Lymphocyte # : 0.98 K/uL  Auto Monocyte # : 0.66 K/uL  Auto Eosinophil # : 0.06 K/uL  Auto Basophil # : 0.04 K/uL  Auto Neutrophil % : 81.6 %  Auto Lymphocyte % : 10.1 %  Auto Monocyte % : 6.8 %  Auto Eosinophil % : 0.6 %  Auto Basophil % : 0.4 %      05-31    141  |  106  |  34<H>  ----------------------------<  77  4.3   |  24  |  1.55<H>    Ca    8.9      31 May 2019 08:11    TPro  6.5  /  Alb  2.8<L>  /  TBili  1.1  /  DBili  x   /  AST  24  /  ALT  22  /  AlkPhos  64  05-31      CAPILLARY BLOOD GLUCOSE      POCT Blood Glucose.: 104 mg/dL (31 May 2019 12:07)  POCT Blood Glucose.: 77 mg/dL (31 May 2019 08:14)  POCT Blood Glucose.: 104 mg/dL (30 May 2019 20:55)  POCT Blood Glucose.: 93 mg/dL (30 May 2019 16:52)      Vital Signs Last 24 Hrs  T(C): 36.8 (31 May 2019 09:14), Max: 36.8 (30 May 2019 17:24)  T(F): 98.3 (31 May 2019 09:14), Max: 98.3 (30 May 2019 17:24)  HR: 82 (31 May 2019 09:21) (58 - 93)  BP: 124/45 (31 May 2019 09:14) (102/60 - 149/66)  BP(mean): --  RR: 20 (31 May 2019 09:14) (19 - 20)  SpO2: 96% (31 May 2019 09:21) (93% - 99%)        PT/INR - ( 31 May 2019 08:11 )   PT: 12.3 sec;   INR: 1.12 ratio                PHYSICAL EXAM:    Constitutional: NAD  HEENT: conjunctive   clear   Neck:  No JVD  Respiratory: decrease bs b/l   Cardiovascular: S1 and S2  Gastrointestinal: BS+, soft, NT/ND  Extremities: No peripheral edema  Neurological:  no focal deficits  Psychiatric: Normal mood, normal affect  : No Howard  Skin: dry   Access: Not applicable

## 2019-05-31 NOTE — PROGRESS NOTE ADULT - SUBJECTIVE AND OBJECTIVE BOX
PROGRESS NOTE  Patient is a 89y old  Male who presents with a chief complaint of cough and sob (31 May 2019 07:50)  Chart and available morning labs /imaging are reviewed electronically , urgent issues addressed . More information  is being added upon completion of rounds , when more information is collected and management discussed with consultants , medical staff and social service/case management on the floor   OVERNIGHT  Agitation and "sundowning "last night are  reported by medical staff . All above noted Patient is resting in a bed comfortably .Cough is improving .No distress noted   HPI:  89 yo male ,[facility] resident with hx of bladder cancer , bladder polyps , prostate cancer , BPH ,cataract ,obesity , urinary retention , HTN , CAD ,LBBB, HLD , PREDIABETES , DEPRESSION ,DEMENTIA presents to ED with cough and chest congestion x 2-3 days CTT showed LLL PNEUMONIA Admitted for septic workup and evaluation,send blood and urine cx,serial lactate levels,monitor vitals closley,ivfs hydration,monitor urine output and renal profile,iv abx initiated -started on zosyn in ER Pulmonology consult called Found to have LBBB on EKG ( known from  2013 ) ,cardiology consult called .Found to have BNP elevation ,but no PVC or signs of fluid overload Admit to monitored unit for cardiac monitoring, obtain echo to evaluate LVEF, monitor ins/outs, monitor renal profile and electrolytes closely, cardiology consult ,send serial bnp , O2 supply, serial chest xrays, monitor weights and oral intake of fluids, nutritionist consult Palliative care consult requested ,to discuss advance directives and complete MOLST Found to have troponin elevation ,likely related to renal insufficiency and demand ischemia 2/2 to pneumonia  Admitted  to telemetry unit for monitoring , send 3 sets of cardiac ensymes to rule out coronary event, obtain ECHO to evaluate LVEF, cardiology consult ,continue current management, O2 supply, anticoagulation plan as per cardiology consult (29 May 2019 00:07)  PAST MEDICAL & SURGICAL HISTORY:  Bladder cancer  Obesity: BMI = 30.2  LBBB (left bundle branch block): noted on  EKG 2013  Bladder polyps  Prostate cancer: diagnosed in 2003; pt received radioactive seeds  BPH (benign prostatic hyperplasia)  HTN (hypertension)  Ureteric obstruction  Prediabetes  H/O: hypertension  S/P cataract surgery, left  Bladder polyps: removed  S/P cystoscopy: multiple cystoscopies over 10 years with removal of  bladder polyps  S/P tonsillectomy: as a child  Chest wall symptom: s/p reomval of shrapnel from right chest wall in 1952  MEDICATIONS  (STANDING):  amoxicillin  500 milliGRAM(s)/clavulanate 1 Tablet(s) Oral two times a day  aspirin  chewable 81 milliGRAM(s) Oral daily  atorvastatin 10 milliGRAM(s) Oral at bedtime  buDESOnide   0.5 milliGRAM(s) Respule 0.5 milliGRAM(s) Inhalation two times a day  dextrose 5%. 1000 milliLiter(s) (50 mL/Hr) IV Continuous <Continuous>  dextrose 50% Injectable 12.5 Gram(s) IV Push once  dextrose 50% Injectable 25 Gram(s) IV Push once  dextrose 50% Injectable 25 Gram(s) IV Push once  doxazosin 4 milliGRAM(s) Oral at bedtime  guaiFENesin ER 1200 milliGRAM(s) Oral every 12 hours  heparin  Injectable 5000 Unit(s) SubCutaneous every 12 hours  insulin lispro (HumaLOG) corrective regimen sliding scale   SubCutaneous three times a day before meals  insulin lispro (HumaLOG) corrective regimen sliding scale   SubCutaneous at bedtime  lactobacillus acidophilus 1 Tablet(s) Oral every 8 hours  memantine 5 milliGRAM(s) Oral two times a day  metoprolol tartrate 25 milliGRAM(s) Oral two times a day  multivitamin 1 Tablet(s) Oral daily  pantoprazole    Tablet 40 milliGRAM(s) Oral before breakfast  venlafaxine 37.5 milliGRAM(s) Oral daily  MEDICATIONS  (PRN):  acetaminophen   Tablet .. 650 milliGRAM(s) Oral every 6 hours PRN Temp greater or equal to 38C (100.4F), Mild Pain (1 - 3)  ALBUTerol/ipratropium for Nebulization 3 milliLiter(s) Nebulizer every 6 hours PRN Shortness of Breath and/or Wheezing  dextrose 40% Gel 15 Gram(s) Oral once PRN Blood Glucose LESS THAN 70 milliGRAM(s)/deciliter  glucagon  Injectable 1 milliGRAM(s) IntraMuscular once PRN Glucose LESS THAN 70 milligrams/deciliter  guaiFENesin   Syrup  (Sugar-Free) 200 milliGRAM(s) Oral every 8 hours PRN Cough  OBJECTIVE  T(C): 36.3 (05-31-19 @ 05:10), Max: 36.9 (05-30-19 @ 14:20)  HR: 82 (05-31-19 @ 09:21) (58 - 94)  BP: 149/66 (05-31-19 @ 05:10) (102/60 - 149/66)  RR: 19 (05-31-19 @ 05:10) (19 - 20)  SpO2: 96% (05-31-19 @ 09:21) (93% - 99%)  Wt(kg): --  I&O's Summary  REVIEW OF SYSTEMS:  A 10-point review of systems was obtained.   Review of systems otherwise unchanged compared to prior visit except as previously noted   PHYSICAL EXAM:  Appearance: NAD. VS past 24 hrs -as above   HEENT:   Moist oral mucosa. Conjunctiva clear b/l.   Neck : supple  Respiratory: Lungs CTAB.  Gastrointestinal:  Soft, nontender. No rebound. No rigidity. BS present	  Cardiovascular: RRR ,S1S2 present  Neurologic: Non-focal. Moving all extremities.  Extremities: No edema. No erythema. No calf tenderness.  Skin: No rashes, No ecchymoses, No cyanosis.	  wounds ,skin lesions-See skin assesment flow sheet   LABS:                        9.6    9.67  )-----------( 128      ( 31 May 2019 08:13 )             28.6   05-31  141  |  106  |  34<H>  ----------------------------<  77  4.3   |  24  |  1.55<H>  Ca    8.9      31 May 2019 08:11  TPro  6.5  /  Alb  2.8<L>  /  TBili  1.1  /  DBili  x   /  AST  24  /  ALT  22  /  AlkPhos  64  05-31  CAPILLARY BLOOD GLUCOSE  POCT Blood Glucose.: 77 mg/dL (31 May 2019 08:14)  POCT Blood Glucose.: 104 mg/dL (30 May 2019 20:55)  POCT Blood Glucose.: 93 mg/dL (30 May 2019 16:52)  POCT Blood Glucose.: 100 mg/dL (30 May 2019 12:49)  PT/INR - ( 31 May 2019 08:11 )   PT: 12.3 sec;   INR: 1.12 ratio    Culture - Blood (collected 29 May 2019 13:22)  Source: .Blood  Preliminary Report (30 May 2019 14:01):    No growth to date.  Culture - Blood (collected 29 May 2019 13:22)  Source: .Blood  Preliminary Report (30 May 2019 14:01):    No growth to date.  Culture - Urine (collected 29 May 2019 12:40)  Source: .Urine  Final Report (30 May 2019 10:53):    No growth  RADIOLOGY & ADDITIONAL TESTS:< from: Xray Modified Barium Swallow (05.30.19 @ 11:23) >  EXAM:  SWALLOW FUNCTION WITH VIDEO                        PROCEDURE DATE:  05/30/2019    INTERPRETATION:  Clinical statement: Unable to swallow.  Technique: Swallowing function study.  Findings/  impression:  Examination was performed under fluoroscopic control. Detailed findings   of the examination are described in the speech pathologist note.  < end of copied text >   reviewed elctronically  ASSESSMENT/PLAN:

## 2019-05-31 NOTE — PROGRESS NOTE ADULT - SUBJECTIVE AND OBJECTIVE BOX
Chief Complaint: Cough, congestion    Interval Events: No events overnight. No complaints.    Review of Systems:  General: No fevers, chills, weight loss or gain  Skin: No rashes, color changes  Cardiovascular: No chest pain, orthopnea  Respiratory: No shortness of breath, cough  Gastrointestinal: No nausea, abdominal pain  Genitourinary: No incontinence, pain with urination  Musculoskeletal: No pain, swelling, decreased range of motion  Neurological: No headache, weakness  Psychiatric: No depression, anxiety  Endocrine: No weight loss or gain, increased thirst  All other systems are comprehensively negative.    Physical Exam:  Vital Signs Last 24 Hrs  T(C): 36.8 (31 May 2019 09:14), Max: 36.9 (30 May 2019 14:20)  T(F): 98.3 (31 May 2019 09:14), Max: 98.4 (30 May 2019 14:20)  HR: 82 (31 May 2019 09:21) (58 - 94)  BP: 124/45 (31 May 2019 09:14) (102/60 - 149/66)  BP(mean): --  RR: 20 (31 May 2019 09:14) (19 - 20)  SpO2: 96% (31 May 2019 09:21) (93% - 99%)  General: NAD  HEENT: MMM  Neck: No JVD, no carotid bruit  Lungs: CTAB  CV: RRR, nl S1/S2, no M/R/G  Abdomen: S/NT/ND, +BS  Extremities: No LE edema, no cyanosis  Neuro: AAOx3, non-focal  Skin: No rash    Labs:             05-31    141  |  106  |  34<H>  ----------------------------<  77  4.3   |  24  |  1.55<H>    Ca    8.9      31 May 2019 08:11    TPro  6.5  /  Alb  2.8<L>  /  TBili  1.1  /  DBili  x   /  AST  24  /  ALT  22  /  AlkPhos  64  05-31                        9.6    9.67  )-----------( 128      ( 31 May 2019 08:13 )             28.6     Telemetry: Sinus rhythm, PVCs

## 2019-05-31 NOTE — PROGRESS NOTE ADULT - PROBLEM SELECTOR PLAN 7
serial bmp , nephrology eval , ins/outs ,check renal us ,iv fluids stopped in view of chf hx - Troponin peaked at 1.217 , No need to trend further as per card ,likely it is related  2/2 to renal insufficiency and demand ischemia due to pneumonia

## 2019-05-31 NOTE — PROGRESS NOTE ADULT - ATTENDING COMMENTS
89 yo male ,[facility] resident with hx of bladder cancer , bladder polyps , prostate cancer , BPH ,cataract ,obesity , urinary retention , HTN , CAD ,LBBB, HLD , PREDIABETES , DEPRESSION ,DEMENTIA presents to ED with cough and chest congestion x 2-3 days CTT showed LLL PNEUMONIA Admitted for septic workup and evaluation,send blood and urine cx,serial lactate levels,monitor vitals closley,ivfs hydration,monitor urine output and renal profile,iv abx initiated -started on zosyn in ER Pulmonology consult called Found to have LBBB on EKG ( known from  2013 ) ,cardiology consult called .Found to have BNP elevation ,but no PVC or signs of fluid overload Admit to monitored unit for cardiac monitoring, obtain echo to evaluate LVEF, monitor ins/outs, monitor renal profile and electrolytes closely, cardiology consult ,send serial bnp , O2 supply, serial chest xrays, monitor weights and oral intake of fluids, nutritionist consult Palliative care consult requested ,to discuss advance directives and complete MOLST Found to have troponin elevation ,likely related to renal insufficiency and demand ischemia 2/2 to pneumonia  Admitted  to telemetry unit for monitoring , send 3 sets of cardiac ensymes to rule out coronary event, obtain ECHO to evaluate LVEF, cardiology consult ,continue current management, O2 supply, anticoagulation plan as per cardiology consult .ANTICIPATE DISCHARGE TO [facility] WHEN CARDIOLOGY CLEARANCE IS OBTAINED . 89 yo male ,[facility] resident with hx of bladder cancer , bladder polyps , prostate cancer , BPH ,cataract ,obesity , urinary retention , HTN , CAD ,LBBB, HLD , PREDIABETES , DEPRESSION ,DEMENTIA presents to ED with cough and chest congestion x 2-3 days CTT showed LLL PNEUMONIA Admitted for septic workup and evaluation,send blood and urine cx,serial lactate levels,monitor vitals closley,ivfs hydration,monitor urine output and renal profile,iv abx initiated -started on zosyn in ER Pulmonology consult called Found to have LBBB on EKG ( known from  2013 ) ,cardiology consult called .Found to have BNP elevation ,but no PVC or signs of fluid overload Admit to monitored unit for cardiac monitoring, obtain echo to evaluate LVEF, monitor ins/outs, monitor renal profile and electrolytes closely, cardiology consult ,send serial bnp , O2 supply, serial chest xrays, monitor weights and oral intake of fluids, nutritionist consult Palliative care consult requested ,to discuss advance directives and complete MOLST Found to have troponin elevation ,likely related to renal insufficiency and demand ischemia 2/2 to pneumonia  Admitted  to telemetry unit for monitoring , send 3 sets of cardiac ensymes to rule out coronary event, obtain ECHO to evaluate LVEF, cardiology consult ,continue current management, O2 supply, anticoagulation plan as per cardiology consult . Patient was seen and examined on a day of discharge . Plan of care , discharge medications and recommendations discussed with consultants and clearance for discharge obtained .Social service , case management  and medical staff are aware of plan. Family is notified. Discharge summary  is  prepared electronically ANTICIPATE DISCHARGE TO [facility] WHEN CARDIOLOGY CLEARANCE IS OBTAINED .SPOKE TO  AND  >>> OK TO D/C HOME IN THE MORNING . INPUT . ABX CHANGED TO PO BY ID CONSULT

## 2019-05-31 NOTE — PROGRESS NOTE ADULT - NSHPATTENDINGPLANDISCUSS_GEN_ALL_CORE
med staff  , Dr Obando , Dr Horvath ,SPEECH AND SWALLOW THERAPIST med staff  , Dr Obando , Dr Horvath ,SPEECH AND SWALLOW THERAPIST ,

## 2019-06-01 LAB
ANION GAP SERPL CALC-SCNC: 10 MMOL/L — SIGNIFICANT CHANGE UP (ref 5–17)
BUN SERPL-MCNC: 27 MG/DL — HIGH (ref 7–23)
CALCIUM SERPL-MCNC: 8.9 MG/DL — SIGNIFICANT CHANGE UP (ref 8.4–10.5)
CHLORIDE SERPL-SCNC: 108 MMOL/L — SIGNIFICANT CHANGE UP (ref 96–108)
CO2 SERPL-SCNC: 24 MMOL/L — SIGNIFICANT CHANGE UP (ref 22–31)
CREAT SERPL-MCNC: 1.46 MG/DL — HIGH (ref 0.5–1.3)
GLUCOSE BLDC GLUCOMTR-MCNC: 100 MG/DL — HIGH (ref 70–99)
GLUCOSE BLDC GLUCOMTR-MCNC: 101 MG/DL — HIGH (ref 70–99)
GLUCOSE BLDC GLUCOMTR-MCNC: 102 MG/DL — HIGH (ref 70–99)
GLUCOSE BLDC GLUCOMTR-MCNC: 83 MG/DL — SIGNIFICANT CHANGE UP (ref 70–99)
GLUCOSE SERPL-MCNC: 76 MG/DL — SIGNIFICANT CHANGE UP (ref 70–99)
HCT VFR BLD CALC: 29.8 % — LOW (ref 39–50)
HGB BLD-MCNC: 9.8 G/DL — LOW (ref 13–17)
MCHC RBC-ENTMCNC: 29.4 PG — SIGNIFICANT CHANGE UP (ref 27–34)
MCHC RBC-ENTMCNC: 32.9 GM/DL — SIGNIFICANT CHANGE UP (ref 32–36)
MCV RBC AUTO: 89.5 FL — SIGNIFICANT CHANGE UP (ref 80–100)
NRBC # BLD: 0 /100 WBCS — SIGNIFICANT CHANGE UP (ref 0–0)
NT-PROBNP SERPL-SCNC: HIGH PG/ML (ref 0–450)
PLATELET # BLD AUTO: 149 K/UL — LOW (ref 150–400)
POTASSIUM SERPL-MCNC: 3.9 MMOL/L — SIGNIFICANT CHANGE UP (ref 3.5–5.3)
POTASSIUM SERPL-SCNC: 3.9 MMOL/L — SIGNIFICANT CHANGE UP (ref 3.5–5.3)
RBC # BLD: 3.33 M/UL — LOW (ref 4.2–5.8)
RBC # FLD: 12.4 % — SIGNIFICANT CHANGE UP (ref 10.3–14.5)
SODIUM SERPL-SCNC: 142 MMOL/L — SIGNIFICANT CHANGE UP (ref 135–145)
WBC # BLD: 7.94 K/UL — SIGNIFICANT CHANGE UP (ref 3.8–10.5)
WBC # FLD AUTO: 7.94 K/UL — SIGNIFICANT CHANGE UP (ref 3.8–10.5)

## 2019-06-01 PROCEDURE — 92526 ORAL FUNCTION THERAPY: CPT

## 2019-06-01 RX ADMIN — Medication 1 TABLET(S): at 21:37

## 2019-06-01 RX ADMIN — MEMANTINE HYDROCHLORIDE 5 MILLIGRAM(S): 10 TABLET ORAL at 17:01

## 2019-06-01 RX ADMIN — HEPARIN SODIUM 5000 UNIT(S): 5000 INJECTION INTRAVENOUS; SUBCUTANEOUS at 17:02

## 2019-06-01 RX ADMIN — Medication 25 MILLIGRAM(S): at 17:01

## 2019-06-01 RX ADMIN — Medication 1 TABLET(S): at 06:27

## 2019-06-01 RX ADMIN — Medication 0.5 MILLIGRAM(S): at 19:37

## 2019-06-01 RX ADMIN — Medication 1 TABLET(S): at 13:00

## 2019-06-01 RX ADMIN — ATORVASTATIN CALCIUM 10 MILLIGRAM(S): 80 TABLET, FILM COATED ORAL at 21:37

## 2019-06-01 RX ADMIN — Medication 81 MILLIGRAM(S): at 13:00

## 2019-06-01 RX ADMIN — Medication 1 TABLET(S): at 17:03

## 2019-06-01 RX ADMIN — Medication 20 MILLIGRAM(S): at 06:27

## 2019-06-01 RX ADMIN — HEPARIN SODIUM 5000 UNIT(S): 5000 INJECTION INTRAVENOUS; SUBCUTANEOUS at 06:28

## 2019-06-01 RX ADMIN — Medication 25 MILLIGRAM(S): at 06:28

## 2019-06-01 RX ADMIN — Medication 3 MILLILITER(S): at 19:36

## 2019-06-01 RX ADMIN — Medication 600 MILLIGRAM(S): at 17:01

## 2019-06-01 RX ADMIN — PANTOPRAZOLE SODIUM 40 MILLIGRAM(S): 20 TABLET, DELAYED RELEASE ORAL at 06:27

## 2019-06-01 RX ADMIN — Medication 4 MILLIGRAM(S): at 21:37

## 2019-06-01 RX ADMIN — MEMANTINE HYDROCHLORIDE 5 MILLIGRAM(S): 10 TABLET ORAL at 06:31

## 2019-06-01 RX ADMIN — Medication 1200 MILLIGRAM(S): at 06:28

## 2019-06-01 RX ADMIN — Medication 37.5 MILLIGRAM(S): at 13:00

## 2019-06-01 RX ADMIN — Medication 0.5 MILLIGRAM(S): at 07:58

## 2019-06-01 NOTE — PROGRESS NOTE ADULT - SUBJECTIVE AND OBJECTIVE BOX
Patient is a 89y Male whom presented to the hospital with ckd and gene   seen in am nad no fever     PAST MEDICAL & SURGICAL HISTORY:  Bladder cancer  Obesity: BMI = 30.2  LBBB (left bundle branch block): noted on  EKG 2013  Bladder polyps  Prostate cancer: diagnosed in 2003; pt received radioactive seeds  BPH (benign prostatic hyperplasia)  HTN (hypertension)  Ureteric obstruction  Prediabetes  H/O: hypertension  S/P cataract surgery, left  Bladder polyps: removed  S/P cystoscopy: multiple cystoscopies over 10 years with removal of  bladder polyps  S/P tonsillectomy: as a child  Chest wall symptom: s/p reomval of shrapnel from right chest wall in 1952      MEDICATIONS  (STANDING):  aspirin  chewable 81 milliGRAM(s) Oral daily  atorvastatin 10 milliGRAM(s) Oral at bedtime  buDESOnide   0.5 milliGRAM(s) Respule 0.5 milliGRAM(s) Inhalation two times a day  dextrose 5%. 1000 milliLiter(s) (50 mL/Hr) IV Continuous <Continuous>  dextrose 50% Injectable 12.5 Gram(s) IV Push once  dextrose 50% Injectable 25 Gram(s) IV Push once  dextrose 50% Injectable 25 Gram(s) IV Push once  doxazosin 4 milliGRAM(s) Oral at bedtime  guaiFENesin ER 1200 milliGRAM(s) Oral every 12 hours  heparin  Injectable 5000 Unit(s) SubCutaneous every 12 hours  insulin lispro (HumaLOG) corrective regimen sliding scale   SubCutaneous three times a day before meals  insulin lispro (HumaLOG) corrective regimen sliding scale   SubCutaneous at bedtime  lactobacillus acidophilus 1 Tablet(s) Oral every 8 hours  memantine 5 milliGRAM(s) Oral two times a day  metoprolol tartrate 25 milliGRAM(s) Oral two times a day  multivitamin 1 Tablet(s) Oral daily  pantoprazole    Tablet 40 milliGRAM(s) Oral before breakfast  piperacillin/tazobactam IVPB. 3.375 Gram(s) IV Intermittent every 12 hours  sodium chloride 0.9%. 1000 milliLiter(s) (50 mL/Hr) IV Continuous <Continuous>  venlafaxine 37.5 milliGRAM(s) Oral daily      Allergies    No Known Drug Allergies  seasonal allergies (Other)    Intolerances        SOCIAL HISTORY:  Denies ETOh,Smoking,     FAMILY HISTORY:                                                  9.8    7.94  )-----------( 149      ( 01 Jun 2019 07:32 )             29.8       CBC Full  -  ( 01 Jun 2019 07:32 )  WBC Count : 7.94 K/uL  RBC Count : 3.33 M/uL  Hemoglobin : 9.8 g/dL  Hematocrit : 29.8 %  Platelet Count - Automated : 149 K/uL  Mean Cell Volume : 89.5 fl  Mean Cell Hemoglobin : 29.4 pg  Mean Cell Hemoglobin Concentration : 32.9 gm/dL  Auto Neutrophil # : x  Auto Lymphocyte # : x  Auto Monocyte # : x  Auto Eosinophil # : x  Auto Basophil # : x  Auto Neutrophil % : x  Auto Lymphocyte % : x  Auto Monocyte % : x  Auto Eosinophil % : x  Auto Basophil % : x      06-01    142  |  108  |  27<H>  ----------------------------<  76  3.9   |  24  |  1.46<H>    Ca    8.9      01 Jun 2019 07:32    TPro  6.5  /  Alb  2.8<L>  /  TBili  1.1  /  DBili  x   /  AST  24  /  ALT  22  /  AlkPhos  64  05-31      CAPILLARY BLOOD GLUCOSE      POCT Blood Glucose.: 102 mg/dL (01 Jun 2019 12:10)  POCT Blood Glucose.: 83 mg/dL (01 Jun 2019 08:08)  POCT Blood Glucose.: 104 mg/dL (31 May 2019 21:30)  POCT Blood Glucose.: 92 mg/dL (31 May 2019 16:46)      Vital Signs Last 24 Hrs  T(C): 36.9 (01 Jun 2019 10:00), Max: 36.9 (31 May 2019 17:01)  T(F): 98.4 (01 Jun 2019 10:00), Max: 98.4 (31 May 2019 17:01)  HR: 70 (01 Jun 2019 10:00) (67 - 91)  BP: 129/78 (01 Jun 2019 10:00) (115/62 - 143/92)  BP(mean): 87 (01 Jun 2019 05:00) (83 - 87)  RR: 18 (01 Jun 2019 10:00) (14 - 20)  SpO2: 96% (01 Jun 2019 10:00) (88% - 100%)        PT/INR - ( 31 May 2019 08:11 )   PT: 12.3 sec;   INR: 1.12 ratio                   PHYSICAL EXAM:    Constitutional: NAD  HEENT: conjunctive   clear   Neck:  No JVD  Respiratory: decrease bs b/l   Cardiovascular: S1 and S2  Gastrointestinal: BS+, soft, NT/ND  Extremities: No peripheral edema  Neurological:  no focal deficits  Psychiatric: Normal mood, normal affect  : No Howard  Skin: dry   Access: Not applicable

## 2019-06-01 NOTE — PROGRESS NOTE ADULT - PROBLEM SELECTOR PLAN 6
result of today's chest xray discussed with Dr Horvath on a phone > recommended to add 20 mg of lasix po daily . D/w Dr Obando and Dr Tellez .Anticipate d/c in the morning .ECHO reviewed . result of today's chest xray discussed with Dr Horvath on a phone > recommended to add 20 mg of lasix po daily . D/w Dr Obando and Dr Tellez .Anticipate d/c in the morning .ECHO reviewed .Today patient dropped O2 sat on ambulation to the restroom and likely will require home oxygen .D/w  and pulmonologist Dr Obando who will determine O2 supply needs ,family is aware

## 2019-06-01 NOTE — PROGRESS NOTE ADULT - PROBLEM SELECTOR PLAN 1
SEEN BY ID and zosyn changed to po augmentin .CHECK O2 SAT ON ROOM AIR AT REST AND ON AMBULATION SEEN BY ID and zosyn changed to po augmentin .CHECK O2 SAT ON ROOM AIR AT REST AND ON AMBULATION >> Today patient dropped O2 sat on ambulation to the restroom and likely will require home oxygen .D/w  and pulmonologist Dr Obando who will determine O2 supply needs ,family is aware

## 2019-06-01 NOTE — PROGRESS NOTE ADULT - NSHPATTENDINGPLANDISCUSS_GEN_ALL_CORE
med staff  , Dr Obando , Dr Horvath  , med staff  , Dr Obando , Dr Horvath  , ,daughter Mehnaz on a phone ,update is given and d/c plan discussed

## 2019-06-01 NOTE — PROGRESS NOTE ADULT - PROBLEM SELECTOR PLAN 7
- Troponin peaked at 1.217 , No need to trend further as per card ,likely it is related  2/2 to renal insufficiency and demand ischemia due to pneumonia

## 2019-06-01 NOTE — PROGRESS NOTE ADULT - SUBJECTIVE AND OBJECTIVE BOX
Patient was examined Chart reviewed Detailed note will be inserted below after going over latest data Meanwhile please refer to my previous notes for Pulmonary/Critical Care assessment recommendations     Home O2 need   6/1/2019 DW  and nurse   Pt ra pulse oximetry is 88% at rest while patient is medically stable He will need home O2 2l continuous portable and stationary 24/7 Diagnosis COPD J44.9 Patient was examined Chart reviewed Detailed note will be inserted below after going over latest data Meanwhile please refer to my previous notes for Pulmonary/Critical Care assessment recommendations     Home O2 need   2019 DW  and nurse   Pt ra pulse oximetry is 88% at rest while patient is medically stable He will need home O2 2l continuous portable and stationary  Diagnosis COPD J44.9     ALVARO BAKER [facility] [address]  3/16/1929 [address]A 2019 DR ELISSA DC  ALLERGY     nka   CONTACT     Datr  Mehnaz Vega [phone] [id number] self   Initial evaluation/Pulmonary Critical Care consultation requested on  2019   by Dr cD  from Dr Obando   Patient examined chart reviewed    HOSPITAL ADMISSION [facility] DR ELISSA DC  PATIENT CAME  FROM (if information available)        TYPE OF VISIT      Subsequent Pulmonary followup     REASON FOR VISIT  For list of problems evaluated/addressed, please see problem list in the note below     PATIENT ALVARO BAKER [facility] [id number]  3/16/1929 DOA 2019 DR ELISSA DC    VITALS/LABS       2019 afeb 70 120/70 18 96%   2019 W 7.9 Hb 9.8 Plt 149 Na 142 K 3.6 CO2 24 Cr 1.4     PATIENT ALVARO BAKER [facility] [address]  3/16/1929 DOA 2019 DR ELISSA DC    PROBLEMS     PNEUMONIA   W -2019 W 14.4-7.9  C 2019 CT ch mf pneumesp lll and lingula  P - pr 29 - 7.7   X 5/31/2019 CXR persist mild pvc   M 2019 blood culture n   M 2019 mrsa p n  SA P positive   M  rvp n   CHF  B - bnp [id number]  e 2019 echo EF 28% RVSP 45Severe as Mod mr   OLIVIA   C --2019   Cr 1.9 - 1.6-1.4  U  us renal No hydro    PATIENT  ALVARO BAKER [facility] [address]  3/16/1929 [address]A 2019 DR ELISSA DC    MEDICATIONS     CAD   Metoprolol 25.2 (2019)   ASA 81 (2019)   DVT P   hpsc (2019)   CHF S   lasix 20 ()   ASP PNEUM  zosyn (2019) ch augmentin ()   DM   iss (2019)   COPD   Duoneb (2019)       GLOBAL ISSUE/BEST PRACTICE:      PROBLEM: HOB elevation:   y            PROBLEM: Stress ulcer proph:    na                      PROBLEM: VTE prophylaxis:       PROBLEM: Glycemic control:    na  PROBLEM: Nutrition:   Cons carb (2019)  PROBLEM: Advanced directive: na     PROBLEM: Allergies:  na    EVENT MBS 2019 dys 3 nectar recommended    REVIEW OF SYMPTOMS     NOTE Changes if any  in ROS and PE are updated in daily HOSPITAL COURSE below      Able to give ROS  Yes     RELIABLE No   CONSTITUTIONAL Weakness Yes  Chills No Vision changes No  ENDOCRINE No unexplained hair loss No heat or cold intolerance    ALLERGY No hives  Sore throat No   RESP Coughing blood no  Shortness of breath YES   NEURO No Headache  Confusion Pain neck No   CARDIAC No Chest pain No Palpitations   GI No Pain abdomen NO   Vomiting NO     PHYSICAL EXAM    HEENT Unremarkable PERRLA atraumatic   RESP Fair air entry EXP prolonged    Harsh breath sound Resp distres mild   CARDIAC S1 S2 No S3     NO JVD    ABDOMEN SOFT BS PRESENT NOT DISTENDED No hepatosplenomegaly PEDAL EDEMA present No calf tenderness  NO rash   GENERAL Not TOXIC looking

## 2019-06-01 NOTE — PROGRESS NOTE ADULT - SUBJECTIVE AND OBJECTIVE BOX
Patient is a 89y Male with a known history of :  CHF (congestive heart failure) (I50.9)  Delirium (R41.0)  Atrial fibrillation (I48.91)  Renal insufficiency (N28.9)  Troponin I above reference range (R74.8)  Prophylactic measure (Z29.9)  OLIVIA (acute kidney injury) (N17.9)  HTN (hypertension) (I10)  CAD (coronary artery disease) (I25.10)  Pneumonia of lower lobe due to infectious organism (J18.1)    HPI:  89 yo male ,[facility] resident with hx of bladder cancer , bladder polyps , prostate cancer , BPH ,cataract ,obesity , urinary retention , HTN , CAD ,LBBB, HLD , PREDIABETES , DEPRESSION ,DEMENTIA presents to ED with cough and chest congestion x 2-3 days CTT showed LLL PNEUMONIA Admitted for septic workup and evaluation,send blood and urine cx,serial lactate levels,monitor vitals closley,ivfs hydration,monitor urine output and renal profile,iv abx initiated -started on zosyn in ER Pulmonology consult called Found to have LBBB on EKG ( known from  2013 ) ,cardiology consult called .Found to have BNP elevation ,but no PVC or signs of fluid overload Admit to monitored unit for cardiac monitoring, obtain echo to evaluate LVEF, monitor ins/outs, monitor renal profile and electrolytes closely, cardiology consult ,send serial bnp , O2 supply, serial chest xrays, monitor weights and oral intake of fluids, nutritionist consult Palliative care consult requested ,to discuss advance directives and complete MOLST Found to have troponin elevation ,likely related to renal insufficiency and demand ischemia 2/2 to pneumonia  Admitted  to telemetry unit for monitoring , send 3 sets of cardiac ensymes to rule out coronary event, obtain ECHO to evaluate LVEF, cardiology consult ,continue current management, O2 supply, anticoagulation plan as per cardiology consult (29 May 2019 00:07)      REVIEW OF SYSTEMS:    CONSTITUTIONAL: No fever, weight loss, or fatigue  EYES: No eye pain, visual disturbances, or discharge  ENMT:  No difficulty hearing, tinnitus, vertigo; No sinus or throat pain  NECK: No pain or stiffness  BREASTS: No pain, masses, or nipple discharge  RESPIRATORY: No cough, wheezing, chills or hemoptysis; No shortness of breath  CARDIOVASCULAR: No chest pain, palpitations, dizziness, or leg swelling  GASTROINTESTINAL: No abdominal or epigastric pain. No nausea, vomiting, or hematemesis; No diarrhea or constipation. No melena or hematochezia.  GENITOURINARY: No dysuria, frequency, hematuria, or incontinence  NEUROLOGICAL: No headaches, memory loss, loss of strength, numbness, or tremors  SKIN: No itching, burning, rashes, or lesions   LYMPH NODES: No enlarged glands  ENDOCRINE: No heat or cold intolerance; No hair loss  MUSCULOSKELETAL: No joint pain or swelling; No muscle, back, or extremity pain  PSYCHIATRIC: No depression, anxiety, mood swings, or difficulty sleeping  HEME/LYMPH: No easy bruising, or bleeding gums  ALLERGY AND IMMUNOLOGIC: No hives or eczema    MEDICATIONS  (STANDING):  amoxicillin  500 milliGRAM(s)/clavulanate 1 Tablet(s) Oral two times a day  aspirin  chewable 81 milliGRAM(s) Oral daily  atorvastatin 10 milliGRAM(s) Oral at bedtime  buDESOnide   0.5 milliGRAM(s) Respule 0.5 milliGRAM(s) Inhalation two times a day  dextrose 5%. 1000 milliLiter(s) (50 mL/Hr) IV Continuous <Continuous>  dextrose 50% Injectable 12.5 Gram(s) IV Push once  dextrose 50% Injectable 25 Gram(s) IV Push once  dextrose 50% Injectable 25 Gram(s) IV Push once  doxazosin 4 milliGRAM(s) Oral at bedtime  furosemide    Tablet 20 milliGRAM(s) Oral daily  guaiFENesin ER 1200 milliGRAM(s) Oral every 12 hours  heparin  Injectable 5000 Unit(s) SubCutaneous every 12 hours  insulin lispro (HumaLOG) corrective regimen sliding scale   SubCutaneous three times a day before meals  insulin lispro (HumaLOG) corrective regimen sliding scale   SubCutaneous at bedtime  lactobacillus acidophilus 1 Tablet(s) Oral every 8 hours  memantine 5 milliGRAM(s) Oral two times a day  metoprolol tartrate 25 milliGRAM(s) Oral two times a day  multivitamin 1 Tablet(s) Oral daily  pantoprazole    Tablet 40 milliGRAM(s) Oral before breakfast  venlafaxine 37.5 milliGRAM(s) Oral daily    MEDICATIONS  (PRN):  acetaminophen   Tablet .. 650 milliGRAM(s) Oral every 6 hours PRN Temp greater or equal to 38C (100.4F), Mild Pain (1 - 3)  ALBUTerol/ipratropium for Nebulization 3 milliLiter(s) Nebulizer every 6 hours PRN Shortness of Breath and/or Wheezing  dextrose 40% Gel 15 Gram(s) Oral once PRN Blood Glucose LESS THAN 70 milliGRAM(s)/deciliter  glucagon  Injectable 1 milliGRAM(s) IntraMuscular once PRN Glucose LESS THAN 70 milligrams/deciliter  guaiFENesin   Syrup  (Sugar-Free) 200 milliGRAM(s) Oral every 8 hours PRN Cough  haloperidol    Injectable 0.5 milliGRAM(s) IntraMuscular every 6 hours PRN Agitation      ALLERGIES: No Known Drug Allergies  seasonal allergies (Other)      FAMILY HISTORY:      Social history:  Alochol:   Smoking:   Drug Use:   Marital Status:     PHYSICAL EXAMINATION:  -----------------------------  T(C): 36.7 (06-01-19 @ 14:06), Max: 36.9 (05-31-19 @ 17:01)  HR: 74 (06-01-19 @ 14:06) (70 - 91)  BP: 112/64 (06-01-19 @ 14:06) (112/64 - 143/92)  RR: 20 (06-01-19 @ 14:06) (14 - 20)  SpO2: 97% (06-01-19 @ 14:06) (88% - 100%)  Wt(kg): --    05-31 @ 07:01  -  06-01 @ 07:00  --------------------------------------------------------  IN:    Oral Fluid: 150 mL  Total IN: 150 mL    OUT:  Total OUT: 0 mL    Total NET: 150 mL            Constitutional: well developed, normal appearance, well groomed, well nourished, no deformities and no acute distress.   Eyes: the conjunctiva exhibited no abnormalities and the eyelids demonstrated no xanthelasmas.   HEENT: normal oral mucosa, no oral pallor and no oral cyanosis.   Neck: normal jugular venous A waves present, normal jugular venous V waves present and no jugular venous fournier A waves.   Pulmonary: no respiratory distress, normal respiratory rhythm and effort, no accessory muscle use and lungs were clear to auscultation bilaterally. Anteriorly  Cardiovascular: heart rate and rhythm were normal, normal S1 and S2 and no murmur, gallop, rub, heave or thrill are present.    Musculoskeletal: the gait could not be assessed.  Extremities: no clubbing of the fingernails, no localized cyanosis, no petechial hemorrhages and no ischemic changes.   Skin: normal skin color and pigmentation, no rash, no venous stasis, no skin lesions, no skin ulcer and no xanthoma was observed.   Psychiatric: oriented to person, place, and time, the affect was normal, the mood was normal and not feeling anxious.     LABS:   --------  06-01    142  |  108  |  27<H>  ----------------------------<  76  3.9   |  24  |  1.46<H>    Ca    8.9      01 Jun 2019 07:32    TPro  6.5  /  Alb  2.8<L>  /  TBili  1.1  /  DBili  x   /  AST  24  /  ALT  22  /  AlkPhos  64  05-31                         9.8    7.94  )-----------( 149      ( 01 Jun 2019 07:32 )             29.8     PT/INR - ( 31 May 2019 08:11 )   PT: 12.3 sec;   INR: 1.12 ratio           06-01 @ 07:32 BNP: 96753 pg/mL              Radiology:    < from: US Transthoracic Echocardiogram w/Doppler Complete (05.29.19 @ 09:01) >    EXAM:  US TTE W DOPPLER COMPLETE                                  PROCEDURE DATE:  05/29/2019          INTERPRETATION:  Ordering Physician: MILTON PRATER [id number]    Indication: Shortness of breath    Technician:     Study Quality: Limited   A complete echocardiographic study was performed utilizing standard   protocol including spectral and color Doppler in all echocardiographic   windows.    Height: 5 feet 9 inches  Weight: 154 pounds  BSA:  Blood Pressure: 114/50    MEASUREMENTS  IVS: 1.1  PWT: 0.94  LA: 5.0  AO: 3.2  LVIDd: 5.5  LVIDs: 5.7  LVOT:    LVEF: 28%  RVSP: 45 mmHg  RA Pressure:  IVC:    FINDINGS  Left Ventricle: Left ventricle is globally hypokinetic with marked   reduced left ventricular ejection fraction wall thickness is normal as   seen in this limited study  Right Ventricle: Right ventricle is normal  Left Atrium: Left atrium is markedly dilated  Right Atrium: Right atrium is normal  Mitral Valve: Mitral valve reveals moderate mitral regurgitation. Mitral   annular calcification is seen. Cannot exclude mild mitral stenosis.  Aortic Valve: Aortic valve is calcified. Peak gradient is 59 mm mean   gradient is 36. This is consistent with severe aortic stenosis  Tricuspid Valve: Tricuspid valve reveals mild to moderate tricuspid   regurgitation. Right-sided pressures are mildly elevated.  Pulmonic Valve: Pulmonic valve appears normal  Diastolic Function:  Pericardium/Pleura: No evidence of pericardial effusion      CONCLUSIONS:  Markedly reduced left ventricular systolic function. Moderate mitral   regurgitation. Mild mitral stenosis. Severe aortic stenosis. Dilated left   atrium. Mild to moderate tricuspid regurgitation. Mild elevation of   right-sided pressures. Technically limited study                  LEIDY JOHNSON M.D., ATTENDING CARDIOLOGIST  This document has been electronically signed. May 30 2019 11:08AM                < end of copied text >

## 2019-06-01 NOTE — PROGRESS NOTE ADULT - SUBJECTIVE AND OBJECTIVE BOX
PROGRESS NOTE  Patient is a 89y old  Male who presents with a chief complaint of cough and sob (31 May 2019 14:39)  Chart and available morning labs /imaging are reviewed electronically , urgent issues addressed . More information  is being added upon completion of rounds , when more information is collected and management discussed with consultants , medical staff and social service/case management on the floor     OVERNIGHT  No new issues reported by medical staff . All above noted Patient is resting in a bed comfortably  .No distress noted   Denies complains , O2 SAT on RA AND ON AMBULATION yesterday was 94-97% , ordered to repeat today .Chest xray noted and d/w Dr Horvath , lasix 20 mg po added as per cardiology cons recommendation .Discharge medications are reviewed and discussed with Dr Obando , continue abx and antitussives ,no need in neb bd   HPI:  89 yo male ,[facility] resident with hx of bladder cancer , bladder polyps , prostate cancer , BPH ,cataract ,obesity , urinary retention , HTN , CAD ,LBBB, HLD , PREDIABETES , DEPRESSION ,DEMENTIA presents to ED with cough and chest congestion x 2-3 days CTT showed LLL PNEUMONIA Admitted for septic workup and evaluation,send blood and urine cx,serial lactate levels,monitor vitals closley,ivfs hydration,monitor urine output and renal profile,iv abx initiated -started on zosyn in ER Pulmonology consult called Found to have LBBB on EKG ( known from  2013 ) ,cardiology consult called .Found to have BNP elevation ,but no PVC or signs of fluid overload Admit to monitored unit for cardiac monitoring, obtain echo to evaluate LVEF, monitor ins/outs, monitor renal profile and electrolytes closely, cardiology consult ,send serial bnp , O2 supply, serial chest xrays, monitor weights and oral intake of fluids, nutritionist consult Palliative care consult requested ,to discuss advance directives and complete MOLST Found to have troponin elevation ,likely related to renal insufficiency and demand ischemia 2/2 to pneumonia  Admitted  to telemetry unit for monitoring , send 3 sets of cardiac ensymes to rule out coronary event, obtain ECHO to evaluate LVEF, cardiology consult ,continue current management, O2 supply, anticoagulation plan as per cardiology consult (29 May 2019 00:07)    PAST MEDICAL & SURGICAL HISTORY:  Bladder cancer  Obesity: BMI = 30.2  LBBB (left bundle branch block): noted on  EKG 2013  Bladder polyps  Prostate cancer: diagnosed in 2003; pt received radioactive seeds  BPH (benign prostatic hyperplasia)  HTN (hypertension)  Ureteric obstruction  Prediabetes  H/O: hypertension  S/P cataract surgery, left  Bladder polyps: removed  S/P cystoscopy: multiple cystoscopies over 10 years with removal of  bladder polyps  S/P tonsillectomy: as a child  Chest wall symptom: s/p reomval of shrapnel from right chest wall in 1952      MEDICATIONS  (STANDING):  amoxicillin  500 milliGRAM(s)/clavulanate 1 Tablet(s) Oral two times a day  aspirin  chewable 81 milliGRAM(s) Oral daily  atorvastatin 10 milliGRAM(s) Oral at bedtime  buDESOnide   0.5 milliGRAM(s) Respule 0.5 milliGRAM(s) Inhalation two times a day  dextrose 5%. 1000 milliLiter(s) (50 mL/Hr) IV Continuous <Continuous>  dextrose 50% Injectable 12.5 Gram(s) IV Push once  dextrose 50% Injectable 25 Gram(s) IV Push once  dextrose 50% Injectable 25 Gram(s) IV Push once  doxazosin 4 milliGRAM(s) Oral at bedtime  furosemide    Tablet 20 milliGRAM(s) Oral daily  guaiFENesin ER 1200 milliGRAM(s) Oral every 12 hours  heparin  Injectable 5000 Unit(s) SubCutaneous every 12 hours  insulin lispro (HumaLOG) corrective regimen sliding scale   SubCutaneous three times a day before meals  insulin lispro (HumaLOG) corrective regimen sliding scale   SubCutaneous at bedtime  lactobacillus acidophilus 1 Tablet(s) Oral every 8 hours  memantine 5 milliGRAM(s) Oral two times a day  metoprolol tartrate 25 milliGRAM(s) Oral two times a day  multivitamin 1 Tablet(s) Oral daily  pantoprazole    Tablet 40 milliGRAM(s) Oral before breakfast  venlafaxine 37.5 milliGRAM(s) Oral daily    MEDICATIONS  (PRN):  acetaminophen   Tablet .. 650 milliGRAM(s) Oral every 6 hours PRN Temp greater or equal to 38C (100.4F), Mild Pain (1 - 3)  ALBUTerol/ipratropium for Nebulization 3 milliLiter(s) Nebulizer every 6 hours PRN Shortness of Breath and/or Wheezing  dextrose 40% Gel 15 Gram(s) Oral once PRN Blood Glucose LESS THAN 70 milliGRAM(s)/deciliter  glucagon  Injectable 1 milliGRAM(s) IntraMuscular once PRN Glucose LESS THAN 70 milligrams/deciliter  guaiFENesin   Syrup  (Sugar-Free) 200 milliGRAM(s) Oral every 8 hours PRN Cough  haloperidol    Injectable 0.5 milliGRAM(s) IntraMuscular every 6 hours PRN Agitation      OBJECTIVE    T(C): 36.7 (06-01-19 @ 05:00), Max: 36.9 (05-31-19 @ 17:01)  HR: 76 (06-01-19 @ 05:00) (67 - 91)  BP: 130/66 (06-01-19 @ 05:00) (115/62 - 143/92)  RR: 20 (06-01-19 @ 05:00) (14 - 20)  SpO2: 97% (06-01-19 @ 05:00) (96% - 100%)  Wt(kg): --  I&O's Summary    31 May 2019 07:01  -  01 Jun 2019 07:00  --------------------------------------------------------  IN: 150 mL / OUT: 0 mL / NET: 150 mL          REVIEW OF SYSTEMS:  CONSTITUTIONAL: No fever, weight loss, or fatigue  EYES: No eye pain, visual disturbances, or discharge  ENMT:   No sinus or throat pain  NECK: No pain or stiffness  RESPIRATORY: No cough, wheezing, chills or hemoptysis; No shortness of breath  CARDIOVASCULAR: No chest pain, palpitations, dizziness, or leg swelling  GASTROINTESTINAL: No abdominal pain. No nausea, vomiting; No diarrhea or constipation. No melena or hematochezia.  GENITOURINARY: No dysuria, frequency, hematuria, or incontinence  NEUROLOGICAL: No headaches, memory loss, loss of strength, numbness, or tremors  SKIN: No itching, burning, rashes, or lesions   MUSCULOSKELETAL: No joint pain or swelling; No muscle, back, or extremity pain    PHYSICAL EXAM:  Appearance: NAD. VS past 24 hrs -as above   HEENT:   Moist oral mucosa. Conjunctiva clear b/l.   Neck : supple  Respiratory: Lungs CTAB.  Gastrointestinal:  Soft, nontender. No rebound. No rigidity. BS present	  Cardiovascular: RRR ,S1S2 present  Neurologic: Non-focal. Moving all extremities.  Extremities: No edema. No erythema. No calf tenderness.  Skin: No rashes, No ecchymoses, No cyanosis.	  wounds ,skin lesions-See skin assesment flow sheet   LABS:                        9.8    7.94  )-----------( 149      ( 01 Jun 2019 07:32 )             29.8     05-31    141  |  106  |  34<H>  ----------------------------<  77  4.3   |  24  |  1.55<H>    Ca    8.9      31 May 2019 08:11    TPro  6.5  /  Alb  2.8<L>  /  TBili  1.1  /  DBili  x   /  AST  24  /  ALT  22  /  AlkPhos  64  05-31    CAPILLARY BLOOD GLUCOSE      POCT Blood Glucose.: 104 mg/dL (31 May 2019 21:30)  POCT Blood Glucose.: 92 mg/dL (31 May 2019 16:46)  POCT Blood Glucose.: 104 mg/dL (31 May 2019 12:07)  POCT Blood Glucose.: 77 mg/dL (31 May 2019 08:14)    PT/INR - ( 31 May 2019 08:11 )   PT: 12.3 sec;   INR: 1.12 ratio               Culture - Blood (collected 29 May 2019 13:22)  Source: .Blood  Preliminary Report (30 May 2019 14:01):    No growth to date.    Culture - Blood (collected 29 May 2019 13:22)  Source: .Blood  Preliminary Report (30 May 2019 14:01):    No growth to date.    Culture - Urine (collected 29 May 2019 12:40)  Source: .Urine  Final Report (30 May 2019 10:53):    No growth      RADIOLOGY & ADDITIONAL TESTS:< from: Xray Chest 1 View- PORTABLE-Routine (05.31.19 @ 12:38) >  EXAM:  XR CHEST PORTABLE ROUTINE 1V                                  PROCEDURE DATE:  05/31/2019          INTERPRETATION:  AP chest on May 31, 2019 at 12:28 PM. Prior study of May   28 showed pulmonary vascular congestion.    On present examinationheart is magnified by technique.    Mild vascular congestion at the lung bases again noted.    Chest is similar to May 28.    IMPRESSION: Persistent mild pulmonary vascular congestion.    < end of copied text >     reviewed elctronically  ASSESSMENT/PLAN: 	  Patient was seen and examined on a day of discharge . Plan of care , discharge medications and recommendations discussed with consultants and clearance for discharge obtained .Social service , case management  and medical staff are aware of plan. Family is notified. Discharge summary  is  prepared electronically ,form  [id number] completed by me .

## 2019-06-01 NOTE — PROGRESS NOTE ADULT - PROBLEM SELECTOR PLAN 1
ACUTE RENAL FAILURE:   continue with hydration will f/u with bun and cr is improving   Serum creatinine is stable at 1.46     , approximating GFR is increased    ml/min.   There is  progression . No uremic symptoms    pos  evidence of anemia .  Fluid status stable.  Will continue to avoid nephrotoxic drugs.  Patient remains asymptomatic.   Continue current therapy.

## 2019-06-01 NOTE — PROGRESS NOTE ADULT - ATTENDING COMMENTS
89 yo male ,[facility] resident with hx of bladder cancer , bladder polyps , prostate cancer , BPH ,cataract ,obesity , urinary retention , HTN , CAD ,LBBB, HLD , PREDIABETES , DEPRESSION ,DEMENTIA presents to ED with cough and chest congestion x 2-3 days CTT showed LLL PNEUMONIA Admitted for septic workup and evaluation,send blood and urine cx,serial lactate levels,monitor vitals closley,ivfs hydration,monitor urine output and renal profile,iv abx initiated -started on zosyn in ER Pulmonology consult called Found to have LBBB on EKG ( known from  2013 ) ,cardiology consult called .Found to have BNP elevation ,but no PVC or signs of fluid overload Admit to monitored unit for cardiac monitoring, obtain echo to evaluate LVEF, monitor ins/outs, monitor renal profile and electrolytes closely, cardiology consult ,send serial bnp , O2 supply, serial chest xrays, monitor weights and oral intake of fluids, nutritionist consult Palliative care consult requested ,to discuss advance directives and complete MOLST Found to have troponin elevation ,likely related to renal insufficiency and demand ischemia 2/2 to pneumonia  Admitted  to telemetry unit for monitoring , send 3 sets of cardiac ensymes to rule out coronary event, obtain ECHO to evaluate LVEF, cardiology consult ,continue current management, O2 supply, anticoagulation plan as per cardiology consult . Patient was seen and examined on a day of discharge . Plan of care , discharge medications and recommendations discussed with consultants and clearance for discharge obtained .Social service , case management  and medical staff are aware of plan. Family is notified. Discharge summary  is  prepared electronically ANTICIPATE DISCHARGE TO [facility] TODAY  IF OK WITH [facility] AND PULM CONSULT[facility]S home oxygen supply 2 l via [place] 24/7 recommended by pulm consult Dr Obando for long term use to treat his CHF .All alternative treatments have been tried and failed. The exacerbation part of pneumonia has been resolved and infection is improving .Patient is in chronic stable state .Patient will need oxygen for long term use for CHF maangement . O2 SATURATION AT REST ON RROM AIR was checked today and is 88%. Discussed with  ,will try to arrange today prior to discharge .Daughter is aware .

## 2019-06-01 NOTE — PROGRESS NOTE ADULT - SUBJECTIVE AND OBJECTIVE BOX
ID Progress note     Name: OLIVIA JOHN  Age: 89y  Gender: Male  MRN: [id number]    Interval History-- Events noted, he needs to be dc with oxygen as he desaturates off oxygen when he ambulates . To be dc home on Monday   Notes reviewed    Past Medical History--  Bladder cancer  Obesity  LBBB (left bundle branch block)  Bladder polyps  Prostate cancer  BPH (benign prostatic hyperplasia)  HTN (hypertension)  Ureteric obstruction  Prediabetes  H/O: hypertension  S/P cataract surgery, left  Bladder polyps  S/P cystoscopy  S/P tonsillectomy  Chest wall symptom      For details regarding the patient's social history, family history, and other miscellaneous elements, please refer the initial infectious diseases consultation and/or the admitting history and physical examination for this admission.    Allergies--  Allergies    No Known Drug Allergies  seasonal allergies (Other)    Intolerances        Medications--  Antibiotics:  amoxicillin  500 milliGRAM(s)/clavulanate 1 Tablet(s) Oral two times a day    Immunologic:    Other:  acetaminophen   Tablet .. PRN  ALBUTerol/ipratropium for Nebulization PRN  aspirin  chewable  atorvastatin  buDESOnide   0.5 milliGRAM(s) Respule  dextrose 40% Gel PRN  dextrose 5%.  dextrose 50% Injectable  dextrose 50% Injectable  dextrose 50% Injectable  doxazosin  furosemide    Tablet  glucagon  Injectable PRN  guaiFENesin   Syrup  (Sugar-Free) PRN  guaiFENesin ER  haloperidol    Injectable PRN  heparin  Injectable  insulin lispro (HumaLOG) corrective regimen sliding scale  insulin lispro (HumaLOG) corrective regimen sliding scale  lactobacillus acidophilus  memantine  metoprolol tartrate  multivitamin  pantoprazole    Tablet  venlafaxine      Review of Systems--  Review of systems unable to be obtained secondary to clinical condition.    Physical Examination--    Vital Signs: T(F): 98.4 (06-01-19 @ 10:00), Max: 98.4 (05-31-19 @ 17:01)  HR: 70 (06-01-19 @ 10:00)  BP: 129/78 (06-01-19 @ 10:00)  RR: 18 (06-01-19 @ 10:00)  SpO2: 96% (06-01-19 @ 10:00)  Wt(kg): --    General: Nontoxic-appearing Male in no acute distress.  HEENT: AT/NC. PERRL. EOMI. Anicteric. Conjunctiva pink and moist. Oropharynx clear. Dentition fair.  Neck: Not rigid. No sense of mass.  Nodes: None palpable.  Lungs: Coarse breath sounds  bilaterally without rales, wheezing or rhonchi  Heart: Regular rate and rhythm. No Murmur. No rub. No gallop. No palpable thrill.  Abdomen: Bowel sounds present and normoactive. Soft. Nondistended. Nontender. No sense of mass. No organomegaly.  Back: No spinal tenderness. No costovertebral angle tenderness.   Extremities: No cyanosis or clubbing. No edema.   Skin: Warm. Dry. Good turgor. No rash. No vasculitic stigmata.  Psychiatric: Appropriate affect and mood for situation.     Laboratory Studies--  CBC                        9.8    7.94  )-----------( 149      ( 01 Jun 2019 07:32 )             29.8       Chemistries  06-01    142  |  108  |  27<H>  ----------------------------<  76  3.9   |  24  |  1.46<H>    Ca    8.9      01 Jun 2019 07:32    TPro  6.5  /  Alb  2.8<L>  /  TBili  1.1  /  DBili  x   /  AST  24  /  ALT  22  /  AlkPhos  64  05-31    Serum Pro-Brain Natriuretic Peptide (05.28.19 @ 21:05)    Serum Pro-Brain Natriuretic Peptide: 03915 pg/mL    Culture Data    Culture - Blood (collected 29 May 2019 13:22)  Source: .Blood  Preliminary Report (30 May 2019 14:01):    No growth to date.    Culture - Blood (collected 29 May 2019 13:22)  Source: .Blood  Preliminary Report (30 May 2019 14:01):    No growth to date.    Culture - Urine (collected 29 May 2019 12:40)  Source: .Urine  Final Report (30 May 2019 10:53):    No growth          Radiology:   Xray Chest 1 View- PORTABLE-Routine (05.31.19 @ 12:38) >  INTERPRETATION:  AP chest on May 31, 2019 at 12:28 PM. Prior study of May   28 showed pulmonary vascular congestion.    On present examinationheart is magnified by technique.    Mild vascular congestion at the lung bases again noted.    Chest is similar to May 28.    IMPRESSION: Persistent mild pulmonary vascular congestion.    US Transthoracic Echocardiogram w/Doppler Complete (05.29.19 @ 09:01) >  LVEF: 28%  RVSP: 45 mmHg  RA Pressure:  IVC:    FINDINGS  Left Ventricle: Left ventricle is globally hypokinetic with marked   reduced left ventricular ejection fraction wall thickness is normal as   seen in this limited study  Right Ventricle: Right ventricle is normal  Left Atrium: Left atrium is markedly dilated  Right Atrium: Right atrium is normal  Mitral Valve: Mitral valve reveals moderate mitral regurgitation. Mitral   annular calcification is seen. Cannot exclude mild mitral stenosis.  Aortic Valve: Aortic valve is calcified. Peak gradient is 59 mm mean   gradient is 36. This is consistent with severe aortic stenosis  Tricuspid Valve: Tricuspid valve reveals mild to moderate tricuspid   regurgitation. Right-sided pressures are mildly elevated.  Pulmonic Valve: Pulmonic valve appears normal  Diastolic Function:  Pericardium/Pleura: No evidence of pericardial effusion      CONCLUSIONS:  Markedly reduced left ventricular systolic function. Moderate mitral   regurgitation. Mild mitral stenosis. Severe aortic stenosis. Dilated left   atrium. Mild to moderate tricuspid regurgitation. Mild elevation of   right-sided pressures. Technically limited study    CT Chest No Cont (05.28.19 @ 21:50) >  FINDINGS:    CHEST:     LUNGS AND LARGE AIRWAYS: Patent central airways. Mild emphysema. Patchy   left lower lobe and lingular nodular airspace opacities compatible with   pneumonia. Dependent atelectasis at the lung bases. Tree-in-bud nodules   scattered throughout the lower lobes and lingula..  PLEURA: No pleural effusion.  VESSELS: Coronary artery calcifications. Atherosclerotic calcifications   of the aorta and great vessels.  HEART: Heart size is normal.No pericardial effusion.  MEDIASTINUM AND MITCH: No lymphadenopathy.  CHEST WALL AND LOWER NECK: Within normal limits.  VISUALIZED UPPER ABDOMEN: Upper lobe renal cyst.  BONES: Degenerative changes of the spine.    IMPRESSION: Multifocal pneumonia predominantly involving the left lower   lobe and lingula.    Assessment--  89 [facility] male [facility] with hx of bladder cancer , bladder polyps , prostate cancer , BPH ,cataract ,obesity , urinary retention , HTN , CAD ,LBBB, HLD , PREDIABETES , DEPRESSION ,DEMENTIA presents to ED with cough and chest congestion x 2-3 days CTT showed LLL Nodular and patchy pneumonia , as he has oropharyngeal dysphagia likely its is aspiration pneumonia . He is afebrile and does not have significant leukocytosis.    He most likely had  CHF exacerbation , he has elevated troponin and PBNP , has severe LV dysfunction with EF 28 %    Plan :   - will continue  Augmentin 500 mg bid to complete the course   - dc planning needs home oxygen   - maximize chf meds   - aspiration precautions and dysphagis diet   - PT evaluation     Continue with present regime .  Appropriate use of antibiotics and adverse effects reviewed.    I have discussed the above plan of care with patient and his family in detail. They expressed understanding of the treatment plan . Risks, benefits and alternatives discussed in detail. I have asked if they have any questions or concerns and appropriately addressed them to the best of my ability .      > 25 minutes spent in direct patient care reviewing  the notes, lab data/ imaging , discussion with multidisciplinary team. All questions were addressed and answered to the best of my capacity .    Thank you for allowing me to participate in the care of your patient .        Cristopher Franklin MD  [phone]

## 2019-06-02 LAB
ANION GAP SERPL CALC-SCNC: 9 MMOL/L — SIGNIFICANT CHANGE UP (ref 5–17)
BUN SERPL-MCNC: 21 MG/DL — SIGNIFICANT CHANGE UP (ref 7–23)
CALCIUM SERPL-MCNC: 9.1 MG/DL — SIGNIFICANT CHANGE UP (ref 8.4–10.5)
CHLORIDE SERPL-SCNC: 105 MMOL/L — SIGNIFICANT CHANGE UP (ref 96–108)
CO2 SERPL-SCNC: 27 MMOL/L — SIGNIFICANT CHANGE UP (ref 22–31)
CREAT SERPL-MCNC: 1.26 MG/DL — SIGNIFICANT CHANGE UP (ref 0.5–1.3)
GLUCOSE BLDC GLUCOMTR-MCNC: 111 MG/DL — HIGH (ref 70–99)
GLUCOSE BLDC GLUCOMTR-MCNC: 112 MG/DL — HIGH (ref 70–99)
GLUCOSE BLDC GLUCOMTR-MCNC: 117 MG/DL — HIGH (ref 70–99)
GLUCOSE BLDC GLUCOMTR-MCNC: 131 MG/DL — HIGH (ref 70–99)
GLUCOSE BLDC GLUCOMTR-MCNC: 83 MG/DL — SIGNIFICANT CHANGE UP (ref 70–99)
GLUCOSE SERPL-MCNC: 79 MG/DL — SIGNIFICANT CHANGE UP (ref 70–99)
MAGNESIUM SERPL-MCNC: 1.5 MG/DL — LOW (ref 1.6–2.6)
MAGNESIUM SERPL-MCNC: 2.1 MG/DL — SIGNIFICANT CHANGE UP (ref 1.6–2.6)
POTASSIUM SERPL-MCNC: 3.6 MMOL/L — SIGNIFICANT CHANGE UP (ref 3.5–5.3)
POTASSIUM SERPL-SCNC: 3.6 MMOL/L — SIGNIFICANT CHANGE UP (ref 3.5–5.3)
SODIUM SERPL-SCNC: 141 MMOL/L — SIGNIFICANT CHANGE UP (ref 135–145)

## 2019-06-02 PROCEDURE — 12345: CPT | Mod: NC

## 2019-06-02 RX ORDER — MAGNESIUM SULFATE 500 MG/ML
2 VIAL (ML) INJECTION ONCE
Refills: 0 | Status: COMPLETED | OUTPATIENT
Start: 2019-06-02 | End: 2019-06-02

## 2019-06-02 RX ADMIN — Medication 1 TABLET(S): at 21:31

## 2019-06-02 RX ADMIN — Medication 1200 MILLIGRAM(S): at 17:14

## 2019-06-02 RX ADMIN — ATORVASTATIN CALCIUM 10 MILLIGRAM(S): 80 TABLET, FILM COATED ORAL at 21:31

## 2019-06-02 RX ADMIN — Medication 0.5 MILLIGRAM(S): at 20:43

## 2019-06-02 RX ADMIN — Medication 1 TABLET(S): at 12:17

## 2019-06-02 RX ADMIN — Medication 0.5 MILLIGRAM(S): at 07:41

## 2019-06-02 RX ADMIN — Medication 50 GRAM(S): at 03:30

## 2019-06-02 RX ADMIN — Medication 81 MILLIGRAM(S): at 12:18

## 2019-06-02 RX ADMIN — Medication 1 TABLET(S): at 08:55

## 2019-06-02 RX ADMIN — Medication 1 TABLET(S): at 17:14

## 2019-06-02 RX ADMIN — MEMANTINE HYDROCHLORIDE 5 MILLIGRAM(S): 10 TABLET ORAL at 17:14

## 2019-06-02 RX ADMIN — Medication 25 MILLIGRAM(S): at 05:51

## 2019-06-02 RX ADMIN — HEPARIN SODIUM 5000 UNIT(S): 5000 INJECTION INTRAVENOUS; SUBCUTANEOUS at 17:14

## 2019-06-02 RX ADMIN — MEMANTINE HYDROCHLORIDE 5 MILLIGRAM(S): 10 TABLET ORAL at 05:50

## 2019-06-02 RX ADMIN — Medication 25 MILLIGRAM(S): at 17:14

## 2019-06-02 RX ADMIN — Medication 4 MILLIGRAM(S): at 21:32

## 2019-06-02 RX ADMIN — Medication 37.5 MILLIGRAM(S): at 12:17

## 2019-06-02 RX ADMIN — Medication 1200 MILLIGRAM(S): at 05:52

## 2019-06-02 RX ADMIN — Medication 20 MILLIGRAM(S): at 05:50

## 2019-06-02 RX ADMIN — HEPARIN SODIUM 5000 UNIT(S): 5000 INJECTION INTRAVENOUS; SUBCUTANEOUS at 06:01

## 2019-06-02 RX ADMIN — PANTOPRAZOLE SODIUM 40 MILLIGRAM(S): 20 TABLET, DELAYED RELEASE ORAL at 05:52

## 2019-06-02 RX ADMIN — Medication 1 TABLET(S): at 05:50

## 2019-06-02 NOTE — PROGRESS NOTE ADULT - SUBJECTIVE AND OBJECTIVE BOX
Patient is a 89y Male whom presented to the hospital with ckd and gene   seen in am nad no fever   , less congested  continue with diuresis   PAST MEDICAL & SURGICAL HISTORY:  Bladder cancer  Obesity: BMI = 30.2  LBBB (left bundle branch block): noted on  EKG 2013  Bladder polyps  Prostate cancer: diagnosed in 2003; pt received radioactive seeds  BPH (benign prostatic hyperplasia)  HTN (hypertension)  Ureteric obstruction  Prediabetes  H/O: hypertension  S/P cataract surgery, left  Bladder polyps: removed  S/P cystoscopy: multiple cystoscopies over 10 years with removal of  bladder polyps  S/P tonsillectomy: as a child  Chest wall symptom: s/p reomval of shrapnel from right chest wall in 1952      MEDICATIONS  (STANDING):  aspirin  chewable 81 milliGRAM(s) Oral daily  atorvastatin 10 milliGRAM(s) Oral at bedtime  buDESOnide   0.5 milliGRAM(s) Respule 0.5 milliGRAM(s) Inhalation two times a day  dextrose 5%. 1000 milliLiter(s) (50 mL/Hr) IV Continuous <Continuous>  dextrose 50% Injectable 12.5 Gram(s) IV Push once  dextrose 50% Injectable 25 Gram(s) IV Push once  dextrose 50% Injectable 25 Gram(s) IV Push once  doxazosin 4 milliGRAM(s) Oral at bedtime  guaiFENesin ER 1200 milliGRAM(s) Oral every 12 hours  heparin  Injectable 5000 Unit(s) SubCutaneous every 12 hours  insulin lispro (HumaLOG) corrective regimen sliding scale   SubCutaneous three times a day before meals  insulin lispro (HumaLOG) corrective regimen sliding scale   SubCutaneous at bedtime  lactobacillus acidophilus 1 Tablet(s) Oral every 8 hours  memantine 5 milliGRAM(s) Oral two times a day  metoprolol tartrate 25 milliGRAM(s) Oral two times a day  multivitamin 1 Tablet(s) Oral daily  pantoprazole    Tablet 40 milliGRAM(s) Oral before breakfast  piperacillin/tazobactam IVPB. 3.375 Gram(s) IV Intermittent every 12 hours  sodium chloride 0.9%. 1000 milliLiter(s) (50 mL/Hr) IV Continuous <Continuous>  venlafaxine 37.5 milliGRAM(s) Oral daily      Allergies    No Known Drug Allergies  seasonal allergies (Other)    Intolerances        SOCIAL HISTORY:  Denies ETOh,Smoking,     FAMILY HISTORY:                                                                                          9.8    7.94  )-----------( 149      ( 01 Jun 2019 07:32 )             29.8       CBC Full  -  ( 01 Jun 2019 07:32 )  WBC Count : 7.94 K/uL  RBC Count : 3.33 M/uL  Hemoglobin : 9.8 g/dL  Hematocrit : 29.8 %  Platelet Count - Automated : 149 K/uL  Mean Cell Volume : 89.5 fl  Mean Cell Hemoglobin : 29.4 pg  Mean Cell Hemoglobin Concentration : 32.9 gm/dL  Auto Neutrophil # : x  Auto Lymphocyte # : x  Auto Monocyte # : x  Auto Eosinophil # : x  Auto Basophil # : x  Auto Neutrophil % : x  Auto Lymphocyte % : x  Auto Monocyte % : x  Auto Eosinophil % : x  Auto Basophil % : x      06-02    141  |  105  |  21  ----------------------------<  79  3.6   |  27  |  1.26    Ca    9.1      02 Jun 2019 07:39  Mg     2.1     06-02        CAPILLARY BLOOD GLUCOSE      POCT Blood Glucose.: 131 mg/dL (02 Jun 2019 16:40)  POCT Blood Glucose.: 111 mg/dL (02 Jun 2019 12:15)  POCT Blood Glucose.: 83 mg/dL (02 Jun 2019 07:59)  POCT Blood Glucose.: 100 mg/dL (01 Jun 2019 21:09)      Vital Signs Last 24 Hrs  T(C): 36.7 (02 Jun 2019 17:14), Max: 37 (01 Jun 2019 21:33)  T(F): 98.1 (02 Jun 2019 17:14), Max: 98.6 (01 Jun 2019 21:33)  HR: 85 (02 Jun 2019 17:14) (69 - 92)  BP: 166/61 (02 Jun 2019 17:14) (116/66 - 166/61)  BP(mean): 98 (02 Jun 2019 05:00) (83 - 98)  RR: 18 (02 Jun 2019 17:14) (16 - 19)  SpO2: 94% (02 Jun 2019 17:14) (93% - 98%)                PT/INR - ( 31 May 2019 08:11 )   PT: 12.3 sec;   INR: 1.12 ratio                   PHYSICAL EXAM:    Constitutional: NAD  HEENT: conjunctive   clear   Neck:  No JVD  Respiratory: decrease bs b/l   Cardiovascular: S1 and S2  Gastrointestinal: BS+, soft, NT/ND  Extremities: No peripheral edema  Neurological:  no focal deficits  Psychiatric: Normal mood, normal affect  : No Howard  Skin: dry   Access: Not applicable

## 2019-06-02 NOTE — PROGRESS NOTE ADULT - PROBLEM SELECTOR PLAN 1
SEEN BY ID and zosyn changed to po augmentin .CHECK O2 SAT ON ROOM AIR AT REST AND ON AMBULATION >> Today patient dropped O2 sat on ambulation to the restroom and likely will require home oxygen .D/w  and pulmonologist Dr Obando who will determine O2 supply needs ,family is aware

## 2019-06-02 NOTE — PROGRESS NOTE ADULT - SUBJECTIVE AND OBJECTIVE BOX
Patient is a 89y Male with a known history of :  CHF (congestive heart failure) (I50.9)  Delirium (R41.0)  Atrial fibrillation (I48.91)  Renal insufficiency (N28.9)  Troponin I above reference range (R74.8)  Prophylactic measure (Z29.9)  OLIVIA (acute kidney injury) (N17.9)  HTN (hypertension) (I10)  CAD (coronary artery disease) (I25.10)  Pneumonia of lower lobe due to infectious organism (J18.1)    HPI:  89 yo male ,[facility] resident with hx of bladder cancer , bladder polyps , prostate cancer , BPH ,cataract ,obesity , urinary retention , HTN , CAD ,LBBB, HLD , PREDIABETES , DEPRESSION ,DEMENTIA presents to ED with cough and chest congestion x 2-3 days CTT showed LLL PNEUMONIA Admitted for septic workup and evaluation,send blood and urine cx,serial lactate levels,monitor vitals closley,ivfs hydration,monitor urine output and renal profile,iv abx initiated -started on zosyn in ER Pulmonology consult called Found to have LBBB on EKG ( known from  2013 ) ,cardiology consult called .Found to have BNP elevation ,but no PVC or signs of fluid overload Admit to monitored unit for cardiac monitoring, obtain echo to evaluate LVEF, monitor ins/outs, monitor renal profile and electrolytes closely, cardiology consult ,send serial bnp , O2 supply, serial chest xrays, monitor weights and oral intake of fluids, nutritionist consult Palliative care consult requested ,to discuss advance directives and complete MOLST Found to have troponin elevation ,likely related to renal insufficiency and demand ischemia 2/2 to pneumonia  Admitted  to telemetry unit for monitoring , send 3 sets of cardiac ensymes to rule out coronary event, obtain ECHO to evaluate LVEF, cardiology consult ,continue current management, O2 supply, anticoagulation plan as per cardiology consult (29 May 2019 00:07)      REVIEW OF SYSTEMS:    CONSTITUTIONAL: No fever, weight loss, or fatigue  EYES: No eye pain, visual disturbances, or discharge  ENMT:  No difficulty hearing, tinnitus, vertigo; No sinus or throat pain  NECK: No pain or stiffness  BREASTS: No pain, masses, or nipple discharge  RESPIRATORY: No cough, wheezing, chills or hemoptysis; No shortness of breath  CARDIOVASCULAR: No chest pain, palpitations, dizziness, or leg swelling  GASTROINTESTINAL: No abdominal or epigastric pain. No nausea, vomiting, or hematemesis; No diarrhea or constipation. No melena or hematochezia.  GENITOURINARY: No dysuria, frequency, hematuria, or incontinence  NEUROLOGICAL: No headaches, memory loss, loss of strength, numbness, or tremors  SKIN: No itching, burning, rashes, or lesions   LYMPH NODES: No enlarged glands  ENDOCRINE: No heat or cold intolerance; No hair loss  MUSCULOSKELETAL: No joint pain or swelling; No muscle, back, or extremity pain  PSYCHIATRIC: No depression, anxiety, mood swings, or difficulty sleeping  HEME/LYMPH: No easy bruising, or bleeding gums  ALLERGY AND IMMUNOLOGIC: No hives or eczema    MEDICATIONS  (STANDING):  amoxicillin  500 milliGRAM(s)/clavulanate 1 Tablet(s) Oral two times a day  aspirin  chewable 81 milliGRAM(s) Oral daily  atorvastatin 10 milliGRAM(s) Oral at bedtime  buDESOnide   0.5 milliGRAM(s) Respule 0.5 milliGRAM(s) Inhalation two times a day  dextrose 5%. 1000 milliLiter(s) (50 mL/Hr) IV Continuous <Continuous>  dextrose 50% Injectable 12.5 Gram(s) IV Push once  dextrose 50% Injectable 25 Gram(s) IV Push once  dextrose 50% Injectable 25 Gram(s) IV Push once  doxazosin 4 milliGRAM(s) Oral at bedtime  furosemide    Tablet 20 milliGRAM(s) Oral daily  guaiFENesin ER 1200 milliGRAM(s) Oral every 12 hours  heparin  Injectable 5000 Unit(s) SubCutaneous every 12 hours  insulin lispro (HumaLOG) corrective regimen sliding scale   SubCutaneous three times a day before meals  insulin lispro (HumaLOG) corrective regimen sliding scale   SubCutaneous at bedtime  lactobacillus acidophilus 1 Tablet(s) Oral every 8 hours  memantine 5 milliGRAM(s) Oral two times a day  metoprolol tartrate 25 milliGRAM(s) Oral two times a day  multivitamin 1 Tablet(s) Oral daily  pantoprazole    Tablet 40 milliGRAM(s) Oral before breakfast  venlafaxine 37.5 milliGRAM(s) Oral daily    MEDICATIONS  (PRN):  acetaminophen   Tablet .. 650 milliGRAM(s) Oral every 6 hours PRN Temp greater or equal to 38C (100.4F), Mild Pain (1 - 3)  ALBUTerol/ipratropium for Nebulization 3 milliLiter(s) Nebulizer every 6 hours PRN Shortness of Breath and/or Wheezing  dextrose 40% Gel 15 Gram(s) Oral once PRN Blood Glucose LESS THAN 70 milliGRAM(s)/deciliter  glucagon  Injectable 1 milliGRAM(s) IntraMuscular once PRN Glucose LESS THAN 70 milligrams/deciliter  guaiFENesin   Syrup  (Sugar-Free) 200 milliGRAM(s) Oral every 8 hours PRN Cough  haloperidol    Injectable 0.5 milliGRAM(s) IntraMuscular every 6 hours PRN Agitation      ALLERGIES: No Known Drug Allergies  seasonal allergies (Other)      FAMILY HISTORY:      Social history:  Alochol:   Smoking:   Drug Use:   Marital Status:     PHYSICAL EXAMINATION:  -----------------------------  T(C): 36.9 (06-02-19 @ 10:17), Max: 37 (06-01-19 @ 21:33)  HR: 80 (06-02-19 @ 10:17) (69 - 92)  BP: 152/66 (06-02-19 @ 10:17) (112/64 - 155/70)  RR: 19 (06-02-19 @ 10:17) (16 - 20)  SpO2: 97% (06-02-19 @ 10:17) (93% - 98%)  Wt(kg): --    06-01 @ 07:01  -  06-02 @ 07:00  --------------------------------------------------------  IN:    Oral Fluid: 350 mL  Total IN: 350 mL    OUT:  Total OUT: 0 mL    Total NET: 350 mL            Constitutional: well developed, normal appearance, well groomed, well nourished, no deformities and no acute distress.   Eyes: the conjunctiva exhibited no abnormalities and the eyelids demonstrated no xanthelasmas.   HEENT: normal oral mucosa, no oral pallor and no oral cyanosis.   Neck: normal jugular venous A waves present, normal jugular venous V waves present and no jugular venous fournier A waves.   Pulmonary: no respiratory distress, normal respiratory rhythm and effort, no accessory muscle use and lungs were clear to auscultation bilaterally. Anteriorly  Cardiovascular: heart rate and rhythm were normal, normal S1 and S2 with II/VI systolic murmur   Musculoskeletal: the gait could not be assessed.   Extremities: no clubbing of the fingernails, no localized cyanosis, no petechial hemorrhages and no ischemic changes.   Skin: normal skin color and pigmentation, no rash, no venous stasis, no skin lesions, no skin ulcer and no xanthoma was observed.      LABS:   --------  06-02    141  |  105  |  21  ----------------------------<  79  3.6   |  27  |  1.26    Ca    9.1      02 Jun 2019 07:39  Mg     2.1     06-02                           9.8    7.94  )-----------( 149      ( 01 Jun 2019 07:32 )             29.8                   Radiology:

## 2019-06-02 NOTE — PROGRESS NOTE ADULT - SUBJECTIVE AND OBJECTIVE BOX
Patient was examined Chart reviewed Detailed note will be inserted below after going over latest data Meanwhile please refer to my previous notes for Pulmonary/Critical Care assessment recommendations Patient was examined Chart reviewed Detailed note will be inserted below after going over latest data Meanwhile please refer to my previous notes for Pulmonary/Critical Care assessment recommendations     NEEDLE OLIVIA [facility] [phone]  3/16/1929 [address]A 2019 DR ELISSA DC  ALLERGY     nka   CONTACT     Datr  Mehnaz Vega [id number] self   Initial evaluation/Pulmonary Critical Care consultation requested on  2019   by Dr Dc  from Dr Obando   Patient examined chart reviewed    HOSPITAL ADMISSION [facility] DR ELISSA DC  PATIENT CAME  FROM (if information available)        TYPE OF VISIT      Subsequent Pulmonary followup     REASON FOR VISIT  For list of problems evaluated/addressed, please see problem list in the note below     PATIENT NEEDLE OLIVIA [facility] [phone]  3/16/1929 [address]A 2019 DR ELISSA DC    VITALS/LABS       2019 afeb 80 150/60 19 98%   2019 Na 141 K 3.6 CO2 27 Cr 1.2     PATIENT NEEDLE OLIVIA [facility] [id number]  3/16/1929 [address]A 2019 DR ELISSA DC    PROBLEMS     PNEUMONIA   W -2019 W 14.4-7.9  C 2019 CT ch mf pneumesp lll and lingula  P - pr 29 - 7.7   X 5/31/2019 CXR persist mild pvc   M 2019 blood culture n   M 2019 mrsa p n  SA P positive   M  rvp n   CHF  B - bnp [id number]  e 2019 echo EF 28% RVSP 45Severe as Mod mr   OLIVIA   C --2019   Cr 1.9 - 1.6-1.4  U  us renal No hydro    PATIENT  NEEDLE OLIVIA [facility] [phone]  3/16/1929 [address]A 2019 DR ELISSA DC    MEDICATIONS     CAD   Metoprolol 25.2 (2019)   ASA 81 (2019)   DVT P   hpsc (2019)   CHF S   lasix 20 ()   ASP PNEUM  zosyn (2019) ch augmentin ()   DM   iss (2019)   COPD   Duoneb (2019)       GLOBAL ISSUE/BEST PRACTICE:      PROBLEM: HOB elevation:   y            PROBLEM: Stress ulcer proph:    na                      PROBLEM: VTE prophylaxis:       PROBLEM: Glycemic control:    na  PROBLEM: Nutrition:   Cons carb (2019)  PROBLEM: Advanced directive: na     PROBLEM: Allergies:  na    EVENT MBS 2019 dys 3 nectar recommended    REVIEW OF SYMPTOMS     NOTE Changes if any  in ROS and PE are updated in daily HOSPITAL COURSE below      Able to give ROS  Yes     RELIABLE No   CONSTITUTIONAL Weakness Yes  Chills No Vision changes No  ENDOCRINE No unexplained hair loss No heat or cold intolerance    ALLERGY No hives  Sore throat No   RESP Coughing blood no  Shortness of breath YES   NEURO No Headache  Confusion Pain neck No   CARDIAC No Chest pain No Palpitations   GI No Pain abdomen NO   Vomiting NO     PHYSICAL EXAM    HEENT Unremarkable PERRLA atraumatic   RESP Fair air entry EXP prolonged    Harsh breath sound Resp distres mild   CARDIAC S1 S2 No S3     NO JVD    ABDOMEN SOFT BS PRESENT NOT DISTENDED No hepatosplenomegaly PEDAL EDEMA present No calf tenderness  NO rash   GENERAL Not TOXIC looki

## 2019-06-02 NOTE — PROGRESS NOTE ADULT - SUBJECTIVE AND OBJECTIVE BOX
Patient is a 89y Male with a known history of :  CHF (congestive heart failure) (I50.9)  Delirium (R41.0)  Atrial fibrillation (I48.91)  Renal insufficiency (N28.9)  Troponin I above reference range (R74.8)  Prophylactic measure (Z29.9)  OLIVIA (acute kidney injury) (N17.9)  HTN (hypertension) (I10)  CAD (coronary artery disease) (I25.10)  Pneumonia of lower lobe due to infectious organism (J18.1)    HPI:  89 yo male ,[facility] resident with hx of bladder cancer , bladder polyps , prostate cancer , BPH ,cataract ,obesity , urinary retention , HTN , CAD ,LBBB, HLD , PREDIABETES , DEPRESSION ,DEMENTIA presents to ED with cough and chest congestion x 2-3 days CTT showed LLL PNEUMONIA Admitted for septic workup and evaluation,send blood and urine cx,serial lactate levels,monitor vitals closley,ivfs hydration,monitor urine output and renal profile,iv abx initiated -started on zosyn in ER Pulmonology consult called Found to have LBBB on EKG ( known from  2013 ) ,cardiology consult called .Found to have BNP elevation ,but no PVC or signs of fluid overload Admit to monitored unit for cardiac monitoring, obtain echo to evaluate LVEF, monitor ins/outs, monitor renal profile and electrolytes closely, cardiology consult ,send serial bnp , O2 supply, serial chest xrays, monitor weights and oral intake of fluids, nutritionist consult Palliative care consult requested ,to discuss advance directives and complete MOLST Found to have troponin elevation ,likely related to renal insufficiency and demand ischemia 2/2 to pneumonia  Admitted  to telemetry unit for monitoring , send 3 sets of cardiac ensymes to rule out coronary event, obtain ECHO to evaluate LVEF, cardiology consult ,continue current management, O2 supply, anticoagulation plan as per cardiology consult (29 May 2019 00:07)      REVIEW OF SYSTEMS:    CONSTITUTIONAL: No fever, weight loss, or fatigue  EYES: No eye pain, visual disturbances, or discharge  ENMT:  No difficulty hearing, tinnitus, vertigo; No sinus or throat pain  NECK: No pain or stiffness  BREASTS: No pain, masses, or nipple discharge  RESPIRATORY: No cough, wheezing, chills or hemoptysis; No shortness of breath  CARDIOVASCULAR: No chest pain, palpitations, dizziness, or leg swelling  GASTROINTESTINAL: No abdominal or epigastric pain. No nausea, vomiting, or hematemesis; No diarrhea or constipation. No melena or hematochezia.  GENITOURINARY: No dysuria, frequency, hematuria, or incontinence  NEUROLOGICAL: No headaches, memory loss, loss of strength, numbness, or tremors  SKIN: No itching, burning, rashes, or lesions   LYMPH NODES: No enlarged glands  ENDOCRINE: No heat or cold intolerance; No hair loss  MUSCULOSKELETAL: No joint pain or swelling; No muscle, back, or extremity pain  PSYCHIATRIC: No depression, anxiety, mood swings, or difficulty sleeping  HEME/LYMPH: No easy bruising, or bleeding gums  ALLERGY AND IMMUNOLOGIC: No hives or eczema    MEDICATIONS  (STANDING):  amoxicillin  500 milliGRAM(s)/clavulanate 1 Tablet(s) Oral two times a day  aspirin  chewable 81 milliGRAM(s) Oral daily  atorvastatin 10 milliGRAM(s) Oral at bedtime  buDESOnide   0.5 milliGRAM(s) Respule 0.5 milliGRAM(s) Inhalation two times a day  dextrose 5%. 1000 milliLiter(s) (50 mL/Hr) IV Continuous <Continuous>  dextrose 50% Injectable 12.5 Gram(s) IV Push once  dextrose 50% Injectable 25 Gram(s) IV Push once  dextrose 50% Injectable 25 Gram(s) IV Push once  doxazosin 4 milliGRAM(s) Oral at bedtime  furosemide    Tablet 20 milliGRAM(s) Oral daily  guaiFENesin ER 1200 milliGRAM(s) Oral every 12 hours  heparin  Injectable 5000 Unit(s) SubCutaneous every 12 hours  insulin lispro (HumaLOG) corrective regimen sliding scale   SubCutaneous three times a day before meals  insulin lispro (HumaLOG) corrective regimen sliding scale   SubCutaneous at bedtime  lactobacillus acidophilus 1 Tablet(s) Oral every 8 hours  memantine 5 milliGRAM(s) Oral two times a day  metoprolol tartrate 25 milliGRAM(s) Oral two times a day  multivitamin 1 Tablet(s) Oral daily  pantoprazole    Tablet 40 milliGRAM(s) Oral before breakfast  venlafaxine 37.5 milliGRAM(s) Oral daily    MEDICATIONS  (PRN):  acetaminophen   Tablet .. 650 milliGRAM(s) Oral every 6 hours PRN Temp greater or equal to 38C (100.4F), Mild Pain (1 - 3)  ALBUTerol/ipratropium for Nebulization 3 milliLiter(s) Nebulizer every 6 hours PRN Shortness of Breath and/or Wheezing  dextrose 40% Gel 15 Gram(s) Oral once PRN Blood Glucose LESS THAN 70 milliGRAM(s)/deciliter  glucagon  Injectable 1 milliGRAM(s) IntraMuscular once PRN Glucose LESS THAN 70 milligrams/deciliter  guaiFENesin   Syrup  (Sugar-Free) 200 milliGRAM(s) Oral every 8 hours PRN Cough  haloperidol    Injectable 0.5 milliGRAM(s) IntraMuscular every 6 hours PRN Agitation      ALLERGIES: No Known Drug Allergies  seasonal allergies (Other)      FAMILY HISTORY:      PHYSICAL EXAMINATION:  -----------------------------  T(C): 36.6 (06-02-19 @ 05:00), Max: 37 (06-01-19 @ 21:33)  HR: 82 (06-02-19 @ 08:29) (69 - 92)  BP: 155/70 (06-02-19 @ 05:00) (112/64 - 155/70)  RR: 19 (06-02-19 @ 05:00) (16 - 20)  SpO2: 98% (06-02-19 @ 08:29) (88% - 98%)  Wt(kg): --    06-01 @ 07:01  -  06-02 @ 07:00  --------------------------------------------------------  IN:    Oral Fluid: 350 mL  Total IN: 350 mL    OUT:  Total OUT: 0 mL    Total NET: 350 mL            Constitutional: well developed, normal appearance, well groomed, well nourished, no deformities and no acute distress.   Eyes: the conjunctiva exhibited no abnormalities and the eyelids demonstrated no xanthelasmas.   HEENT: normal oral mucosa, no oral pallor and no oral cyanosis.   Neck: normal jugular venous A waves present, normal jugular venous V waves present and no jugular venous fournier A waves.   Pulmonary: no respiratory distress, normal respiratory rhythm and effort, no accessory muscle use and lungs were clear to auscultation bilaterally.   Cardiovascular: heart rate and rhythm were normal, normal S1 and S2 and no murmur, gallop, rub, heave or thrill are present.   Abdomen: soft, non-tender, no hepato-splenomegaly and no abdominal mass palpated.   Musculoskeletal: the gait could not be assessed..   Extremities: no clubbing of the fingernails, no localized cyanosis, no petechial hemorrhages and no ischemic changes.   Skin: normal skin color and pigmentation, no rash, no venous stasis, no skin lesions, no skin ulcer and no xanthoma was observed.   Psychiatric: oriented to person, place, and time, the affect was normal, the mood was normal and not feeling anxious.     LABS:   --------  06-02    141  |  105  |  21  ----------------------------<  79  3.6   |  27  |  1.26    Ca    9.1      02 Jun 2019 07:39  Mg     2.1     06-02                           9.8    7.94  )-----------( 149      ( 01 Jun 2019 07:32 )             29.8                 RADIOLOGY:  -----------------        ECG:

## 2019-06-02 NOTE — PROGRESS NOTE ADULT - SUBJECTIVE AND OBJECTIVE BOX
ID Progress note     Name: OLIVIA JOHN  Age: 89y  Gender: Male  MRN: [id number]    Interval History-- Events noted, doing well, less congested .   Notes reviewed    Past Medical History--  Bladder cancer  Obesity  LBBB (left bundle branch block)  Bladder polyps  Prostate cancer  BPH (benign prostatic hyperplasia)  HTN (hypertension)  Ureteric obstruction  Prediabetes  H/O: hypertension  S/P cataract surgery, left  Bladder polyps  S/P cystoscopy  S/P tonsillectomy  Chest wall symptom      For details regarding the patient's social history, family history, and other miscellaneous elements, please refer the initial infectious diseases consultation and or the admitting history and physical examination for this admission.    Allergies--  Allergies    No Known Drug Allergies  seasonal allergies (Other)    Intolerances        Medications--  Antibiotics:  amoxicillin  500 milliGRAM(s)/clavulanate 1 Tablet(s) Oral two times a day    Immunologic:    Other:  acetaminophen   Tablet .. PRN  ALBUTerol/ipratropium for Nebulization PRN  aspirin  chewable  atorvastatin  buDESOnide   0.5 milliGRAM(s) Respule  dextrose 40% Gel PRN  dextrose 5%.  dextrose 50% Injectable  dextrose 50% Injectable  dextrose 50% Injectable  doxazosin  furosemide    Tablet  glucagon  Injectable PRN  guaiFENesin   Syrup  (Sugar-Free) PRN  guaiFENesin ER  haloperidol    Injectable PRN  heparin  Injectable  insulin lispro (HumaLOG) corrective regimen sliding scale  insulin lispro (HumaLOG) corrective regimen sliding scale  lactobacillus acidophilus  memantine  metoprolol tartrate  multivitamin  pantoprazole    Tablet  venlafaxine      Review of Systems--  Review of systems unable to be obtained secondary to clinical condition.    Physical Examination--    Vital Signs: T(F): 98.5 (06-02-19 @ 10:17), Max: 98.6 (06-01-19 @ 21:33)  HR: 80 (06-02-19 @ 10:17)  BP: 152/66 (06-02-19 @ 10:17)  RR: 19 (06-02-19 @ 10:17)  SpO2: 97% (06-02-19 @ 10:17)  Wt(kg): --    General: Nontoxic-appearing Male in no acute distress.  HEENT: AT/NC. PERRL. EOMI. Anicteric. Conjunctiva pink and moist. Oropharynx clear. Dentition fair.  Neck: Not rigid. No sense of mass.  Nodes: None palpable.  Lungs: Coarse breath sounds  bilaterally without rales, wheezing or rhonchi  Heart: Regular rate and rhythm. No Murmur. No rub. No gallop. No palpable thrill.  Abdomen: Bowel sounds present and normoactive. Soft. Nondistended. Nontender. No sense of mass. No organomegaly.  Back: No spinal tenderness. No costovertebral angle tenderness.   Extremities: No cyanosis or clubbing. No edema.   Skin: Warm. Dry. Good turgor. No rash. No vasculitic stigmata.  Psychiatric: Appropriate affect and mood for situation.       Laboratory Studies--  CBC                        9.8    7.94  )-----------( 149      ( 01 Jun 2019 07:32 )             29.8       Chemistries  06-02    141  |  105  |  21  ----------------------------<  79  3.6   |  27  |  1.26    Ca    9.1      02 Jun 2019 07:39  Mg     2.1     06-02        Culture Data    Culture - Blood (collected 29 May 2019 13:22)  Source: .Blood  Preliminary Report (30 May 2019 14:01):    No growth to date.    Culture - Blood (collected 29 May 2019 13:22)  Source: .Blood  Preliminary Report (30 May 2019 14:01):    No growth to date.    Culture - Urine (collected 29 May 2019 12:40)  Source: .Urine  Final Report (30 May 2019 10:53):    No growth      Radiology:   Xray Chest 1 View- PORTABLE-Routine (05.31.19 @ 12:38) >  INTERPRETATION:  AP chest on May 31, 2019 at 12:28 PM. Prior study of May   28 showed pulmonary vascular congestion.    On present examinationheart is magnified by technique.    Mild vascular congestion at the lung bases again noted.    Chest is similar to May 28.    IMPRESSION: Persistent mild pulmonary vascular congestion.    US Transthoracic Echocardiogram w/Doppler Complete (05.29.19 @ 09:01) >  LVEF: 28%  RVSP: 45 mmHg  RA Pressure:  IVC:    FINDINGS  Left Ventricle: Left ventricle is globally hypokinetic with marked   reduced left ventricular ejection fraction wall thickness is normal as   seen in this limited study  Right Ventricle: Right ventricle is normal  Left Atrium: Left atrium is markedly dilated  Right Atrium: Right atrium is normal  Mitral Valve: Mitral valve reveals moderate mitral regurgitation. Mitral   annular calcification is seen. Cannot exclude mild mitral stenosis.  Aortic Valve: Aortic valve is calcified. Peak gradient is 59 mm mean   gradient is 36. This is consistent with severe aortic stenosis  Tricuspid Valve: Tricuspid valve reveals mild to moderate tricuspid   regurgitation. Right-sided pressures are mildly elevated.  Pulmonic Valve: Pulmonic valve appears normal  Diastolic Function:  Pericardium/Pleura: No evidence of pericardial effusion      CONCLUSIONS:  Markedly reduced left ventricular systolic function. Moderate mitral   regurgitation. Mild mitral stenosis. Severe aortic stenosis. Dilated left   atrium. Mild to moderate tricuspid regurgitation. Mild elevation of   right-sided pressures. Technically limited study    CT Chest No Cont (05.28.19 @ 21:50) >  FINDINGS:    CHEST:     LUNGS AND LARGE AIRWAYS: Patent central airways. Mild emphysema. Patchy   left lower lobe and lingular nodular airspace opacities compatible with   pneumonia. Dependent atelectasis at the lung bases. Tree-in-bud nodules   scattered throughout the lower lobes and lingula..  PLEURA: No pleural effusion.  VESSELS: Coronary artery calcifications. Atherosclerotic calcifications   of the aorta and great vessels.  HEART: Heart size is normal.No pericardial effusion.  MEDIASTINUM AND MITCH: No lymphadenopathy.  CHEST WALL AND LOWER NECK: Within normal limits.  VISUALIZED UPPER ABDOMEN: Upper lobe renal cyst.  BONES: Degenerative changes of the spine.    IMPRESSION: Multifocal pneumonia predominantly involving the left lower   lobe and lingula.    Assessment--  89 yo male ,[facility] resident with hx of bladder cancer , bladder polyps , prostate cancer , BPH ,cataract ,obesity , urinary retention , HTN , CAD ,LBBB, HLD , PREDIABETES , DEPRESSION ,DEMENTIA presents to ED with cough and chest congestion x 2-3 days CTT showed LLL Nodular and patchy pneumonia , as he has oropharyngeal dysphagia likely its is aspiration pneumonia . He is afebrile and does not have significant leukocytosis.    He most likely had  CHF exacerbation , he has elevated troponin and PBNP , has severe LV dysfunction with EF 28 %    Plan :   - will continue  Augmentin 500 mg bid to complete the course   - dc planning needs home oxygen , dc in am   - maximize chf meds   - aspiration precautions and dysphagia diet   - PT evaluation     Continue with present regime .  Appropriate use of antibiotics and adverse effects reviewed.    I have discussed the above plan of care with patient and family in detail. They expressed understanding of the treatment plan . Risks, benefits and alternatives discussed in detail. I have asked if they have any questions or concerns and appropriately addressed them to the best of my ability .      > 15 minutes spent in direct patient care reviewing  the notes, lab data/ imaging , discussion with multidisciplinary team. All questions were addressed and answered to the best of my capacity .    Thank you for allowing me to participate in the care of your patient .        Cristopher Franklin MD  [phone]

## 2019-06-02 NOTE — PROGRESS NOTE ADULT - PROBLEM SELECTOR PLAN 6
result of today's chest xray discussed with Dr Horvath on a phone > recommended to add 20 mg of lasix po daily . D/w Dr Obando and Dr Tellez .Anticipate d/c in the morning .ECHO reviewed .Today patient dropped O2 sat on ambulation to the restroom and likely will require home oxygen .D/w  and pulmonologist Dr Obando who will determine O2 supply needs ,family is aware

## 2019-06-02 NOTE — PROGRESS NOTE ADULT - PROBLEM SELECTOR PLAN 1
ACUTE RENAL FAILURE: continue with diuresis , dc in am   continue with hydration will f/u with bun and cr is improving   Serum creatinine is stable at 1.26     , approximating GFR is increased    ml/min.   There is  progression . No uremic symptoms    pos  evidence of anemia .  Fluid status stable.  Will continue to avoid nephrotoxic drugs.  Patient remains asymptomatic.   Continue current therapy.

## 2019-06-03 VITALS
DIASTOLIC BLOOD PRESSURE: 60 MMHG | TEMPERATURE: 98 F | RESPIRATION RATE: 24 BRPM | HEART RATE: 89 BPM | SYSTOLIC BLOOD PRESSURE: 134 MMHG | OXYGEN SATURATION: 98 %

## 2019-06-03 LAB
CULTURE RESULTS: SIGNIFICANT CHANGE UP
CULTURE RESULTS: SIGNIFICANT CHANGE UP
GLUCOSE BLDC GLUCOMTR-MCNC: 105 MG/DL — HIGH (ref 70–99)
GLUCOSE BLDC GLUCOMTR-MCNC: 113 MG/DL — HIGH (ref 70–99)
SPECIMEN SOURCE: SIGNIFICANT CHANGE UP
SPECIMEN SOURCE: SIGNIFICANT CHANGE UP

## 2019-06-03 PROCEDURE — 71046 X-RAY EXAM CHEST 2 VIEWS: CPT

## 2019-06-03 PROCEDURE — 94640 AIRWAY INHALATION TREATMENT: CPT

## 2019-06-03 PROCEDURE — 83615 LACTATE (LD) (LDH) ENZYME: CPT

## 2019-06-03 PROCEDURE — 85610 PROTHROMBIN TIME: CPT

## 2019-06-03 PROCEDURE — 80048 BASIC METABOLIC PNL TOTAL CA: CPT

## 2019-06-03 PROCEDURE — 94760 N-INVAS EAR/PLS OXIMETRY 1: CPT

## 2019-06-03 PROCEDURE — 87581 M.PNEUMON DNA AMP PROBE: CPT

## 2019-06-03 PROCEDURE — 71045 X-RAY EXAM CHEST 1 VIEW: CPT

## 2019-06-03 PROCEDURE — 99285 EMERGENCY DEPT VISIT HI MDM: CPT | Mod: 25

## 2019-06-03 PROCEDURE — 76770 US EXAM ABDO BACK WALL COMP: CPT

## 2019-06-03 PROCEDURE — 96375 TX/PRO/DX INJ NEW DRUG ADDON: CPT

## 2019-06-03 PROCEDURE — 87086 URINE CULTURE/COLONY COUNT: CPT

## 2019-06-03 PROCEDURE — 85018 HEMOGLOBIN: CPT

## 2019-06-03 PROCEDURE — 83010 ASSAY OF HAPTOGLOBIN QUANT: CPT

## 2019-06-03 PROCEDURE — 84484 ASSAY OF TROPONIN QUANT: CPT

## 2019-06-03 PROCEDURE — 80053 COMPREHEN METABOLIC PANEL: CPT

## 2019-06-03 PROCEDURE — 83880 ASSAY OF NATRIURETIC PEPTIDE: CPT

## 2019-06-03 PROCEDURE — 83735 ASSAY OF MAGNESIUM: CPT

## 2019-06-03 PROCEDURE — 87798 DETECT AGENT NOS DNA AMP: CPT

## 2019-06-03 PROCEDURE — 85014 HEMATOCRIT: CPT

## 2019-06-03 PROCEDURE — 83550 IRON BINDING TEST: CPT

## 2019-06-03 PROCEDURE — 87641 MR-STAPH DNA AMP PROBE: CPT

## 2019-06-03 PROCEDURE — 97161 PT EVAL LOW COMPLEX 20 MIN: CPT

## 2019-06-03 PROCEDURE — 93005 ELECTROCARDIOGRAM TRACING: CPT

## 2019-06-03 PROCEDURE — 83036 HEMOGLOBIN GLYCOSYLATED A1C: CPT

## 2019-06-03 PROCEDURE — 83540 ASSAY OF IRON: CPT

## 2019-06-03 PROCEDURE — 87486 CHLMYD PNEUM DNA AMP PROBE: CPT

## 2019-06-03 PROCEDURE — 74230 X-RAY XM SWLNG FUNCJ C+: CPT

## 2019-06-03 PROCEDURE — 36415 COLL VENOUS BLD VENIPUNCTURE: CPT

## 2019-06-03 PROCEDURE — 93970 EXTREMITY STUDY: CPT

## 2019-06-03 PROCEDURE — 85045 AUTOMATED RETICULOCYTE COUNT: CPT

## 2019-06-03 PROCEDURE — 82607 VITAMIN B-12: CPT

## 2019-06-03 PROCEDURE — 82962 GLUCOSE BLOOD TEST: CPT

## 2019-06-03 PROCEDURE — 85027 COMPLETE CBC AUTOMATED: CPT

## 2019-06-03 PROCEDURE — 87899 AGENT NOS ASSAY W/OPTIC: CPT

## 2019-06-03 PROCEDURE — 87040 BLOOD CULTURE FOR BACTERIA: CPT

## 2019-06-03 PROCEDURE — 83605 ASSAY OF LACTIC ACID: CPT

## 2019-06-03 PROCEDURE — 87631 RESP VIRUS 3-5 TARGETS: CPT

## 2019-06-03 PROCEDURE — 81001 URINALYSIS AUTO W/SCOPE: CPT

## 2019-06-03 PROCEDURE — 82728 ASSAY OF FERRITIN: CPT

## 2019-06-03 PROCEDURE — 84145 PROCALCITONIN (PCT): CPT

## 2019-06-03 PROCEDURE — 82746 ASSAY OF FOLIC ACID SERUM: CPT

## 2019-06-03 PROCEDURE — 87449 NOS EACH ORGANISM AG IA: CPT

## 2019-06-03 PROCEDURE — 71250 CT THORAX DX C-: CPT

## 2019-06-03 PROCEDURE — 93306 TTE W/DOPPLER COMPLETE: CPT

## 2019-06-03 PROCEDURE — 96374 THER/PROPH/DIAG INJ IV PUSH: CPT

## 2019-06-03 PROCEDURE — 87633 RESP VIRUS 12-25 TARGETS: CPT

## 2019-06-03 PROCEDURE — 87640 STAPH A DNA AMP PROBE: CPT

## 2019-06-03 RX ORDER — METOPROLOL TARTRATE 50 MG
1 TABLET ORAL
Qty: 0 | Refills: 0 | DISCHARGE
Start: 2019-06-03

## 2019-06-03 RX ADMIN — Medication 81 MILLIGRAM(S): at 11:01

## 2019-06-03 RX ADMIN — PANTOPRAZOLE SODIUM 40 MILLIGRAM(S): 20 TABLET, DELAYED RELEASE ORAL at 05:36

## 2019-06-03 RX ADMIN — Medication 1 TABLET(S): at 11:01

## 2019-06-03 RX ADMIN — Medication 1 TABLET(S): at 10:59

## 2019-06-03 RX ADMIN — HEPARIN SODIUM 5000 UNIT(S): 5000 INJECTION INTRAVENOUS; SUBCUTANEOUS at 05:34

## 2019-06-03 RX ADMIN — Medication 0.5 MILLIGRAM(S): at 07:33

## 2019-06-03 RX ADMIN — Medication 1 TABLET(S): at 05:36

## 2019-06-03 RX ADMIN — Medication 37.5 MILLIGRAM(S): at 11:00

## 2019-06-03 RX ADMIN — Medication 1200 MILLIGRAM(S): at 05:35

## 2019-06-03 RX ADMIN — Medication 1 TABLET(S): at 11:00

## 2019-06-03 RX ADMIN — Medication 20 MILLIGRAM(S): at 05:34

## 2019-06-03 RX ADMIN — MEMANTINE HYDROCHLORIDE 5 MILLIGRAM(S): 10 TABLET ORAL at 05:36

## 2019-06-03 RX ADMIN — Medication 25 MILLIGRAM(S): at 05:36

## 2019-06-03 NOTE — PROGRESS NOTE ADULT - SUBJECTIVE AND OBJECTIVE BOX
ID Progress note    Name: OLIVIA JOHN  Age: 89y  Gender: Male  MRN: [id number]    Interval History-- Events noted, doing well, afebrile, was OOB and ambulated. Still requires oxygen   Notes reviewed    Past Medical History--  Bladder cancer  Obesity  LBBB (left bundle branch block)  Bladder polyps  Prostate cancer  BPH (benign prostatic hyperplasia)  HTN (hypertension)  Ureteric obstruction  Prediabetes  H/O: hypertension  S/P cataract surgery, left  Bladder polyps  S/P cystoscopy  S/P tonsillectomy  Chest wall symptom      For details regarding the patient's social history, family history, and other miscellaneous elements, please refer the initial infectious diseases consultation and/or the admitting history and physical examination for this admission.    Allergies--  Allergies    No Known Drug Allergies  seasonal allergies (Other)    Intolerances        Medications--  Antibiotics:  amoxicillin  500 milliGRAM(s)/clavulanate 1 Tablet(s) Oral two times a day    Immunologic:    Other:  acetaminophen   Tablet .. PRN  ALBUTerol/ipratropium for Nebulization PRN  aspirin  chewable  atorvastatin  buDESOnide   0.5 milliGRAM(s) Respule  dextrose 40% Gel PRN  dextrose 5%.  dextrose 50% Injectable  dextrose 50% Injectable  dextrose 50% Injectable  doxazosin  furosemide    Tablet  glucagon  Injectable PRN  guaiFENesin   Syrup  (Sugar-Free) PRN  guaiFENesin ER  haloperidol    Injectable PRN  heparin  Injectable  insulin lispro (HumaLOG) corrective regimen sliding scale  insulin lispro (HumaLOG) corrective regimen sliding scale  lactobacillus acidophilus  memantine  metoprolol tartrate  multivitamin  pantoprazole    Tablet  venlafaxine      Review of Systems--  Review of systems unable to be obtained secondary to clinical condition.    Physical Examination--    Vital Signs: T(F): 97.8 (06-03-19 @ 09:25), Max: 98.8 (06-03-19 @ 05:00)  HR: 84 (06-03-19 @ 09:25)  BP: 122/57 (06-03-19 @ 09:25)  RR: 24 (06-03-19 @ 09:25)  SpO2: 95% (06-03-19 @ 09:25)  Wt(kg): --    General: Nontoxic-appearing Male in no acute distress.  HEENT: AT/NC. PERRL. EOMI. Anicteric. Conjunctiva pink and moist. Oropharynx clear. Dentition fair.  Neck: Not rigid. No sense of mass.  Nodes: None palpable.  Lungs: Coarse breath sounds  bilaterally without rales, wheezing or rhonchi  Heart: Regular rate and rhythm. No Murmur. No rub. No gallop. No palpable thrill.  Abdomen: Bowel sounds present and normoactive. Soft. Nondistended. Nontender. No sense of mass. No organomegaly.  Back: No spinal tenderness. No costovertebral angle tenderness.   Extremities: No cyanosis or clubbing. No edema.   Skin: Warm. Dry. Good turgor. No rash. No vasculitic stigmata.  Psychiatric: Appropriate affect and mood for situation.     Laboratory Studies--  CBC      Chemistries  06-02    141  |  105  |  21  ----------------------------<  79  3.6   |  27  |  1.26    Ca    9.1      02 Jun 2019 07:39  Mg     2.1     06-02        Culture Data    Culture - Blood (collected 29 May 2019 13:22)  Source: .Blood  Preliminary Report (30 May 2019 14:01):    No growth to date.    Culture - Blood (collected 29 May 2019 13:22)  Source: .Blood  Preliminary Report (30 May 2019 14:01):    No growth to date.    Culture - Urine (collected 29 May 2019 12:40)  Source: .Urine  Final Report (30 May 2019 10:53):    No growth      Radiology:   Xray Chest 1 View- PORTABLE-Routine (05.31.19 @ 12:38) >  INTERPRETATION:  AP chest on May 31, 2019 at 12:28 PM. Prior study of May   28 showed pulmonary vascular congestion.    On present examinationheart is magnified by technique.    Mild vascular congestion at the lung bases again noted.    Chest is similar to May 28.    IMPRESSION: Persistent mild pulmonary vascular congestion.    US Transthoracic Echocardiogram w/Doppler Complete (05.29.19 @ 09:01) >  LVEF: 28%  RVSP: 45 mmHg  RA Pressure:  IVC:    CONCLUSIONS:  Markedly reduced left ventricular systolic function. Moderate mitral   regurgitation. Mild mitral stenosis. Severe aortic stenosis. Dilated left   atrium. Mild to moderate tricuspid regurgitation. Mild elevation of   right-sided pressures. Technically limited study    CT Chest No Cont (05.28.19 @ 21:50) >  FINDINGS:    CHEST:     LUNGS AND LARGE AIRWAYS: Patent central airways. Mild emphysema. Patchy   left lower lobe and lingular nodular airspace opacities compatible with   pneumonia. Dependent atelectasis at the lung bases. Tree-in-bud nodules   scattered throughout the lower lobes and lingula..  PLEURA: No pleural effusion.  VESSELS: Coronary artery calcifications. Atherosclerotic calcifications   of the aorta and great vessels.  HEART: Heart size is normal.No pericardial effusion.  MEDIASTINUM AND MITCH: No lymphadenopathy.  CHEST WALL AND LOWER NECK: Within normal limits.  VISUALIZED UPPER ABDOMEN: Upper lobe renal cyst.  BONES: Degenerative changes of the spine.    IMPRESSION: Multifocal pneumonia predominantly involving the left lower   lobe and lingula.    Assessment--  89 yo male ,[facility] resident with hx of bladder cancer , bladder polyps , prostate cancer , BPH ,cataract ,obesity , urinary retention , HTN , CAD ,LBBB, HLD , PREDIABETES , DEPRESSION ,DEMENTIA presents to ED with cough and chest congestion x 2-3 days CTT showed LLL Nodular and patchy pneumonia , as he has oropharyngeal dysphagia likely its is aspiration pneumonia . He is afebrile and does not have significant leukocytosis.    He most likely had  CHF exacerbation , he has elevated troponin and PBNP , has severe LV dysfunction with EF 28 %    Plan :   - will continue  Augmentin 500 mg bid to complete the course   - dc planning needs home oxygen , dc today if [facility] accepts with home oxygen   - maximize chf meds   - aspiration precautions and dysphagia diet   - PT evaluation     Continue with present regime .  Appropriate use of antibiotics and adverse effects reviewed.    I have discussed the above plan of care with patient's family in detail. They expressed understanding of the treatment plan . Risks, benefits and alternatives discussed in detail. I have asked if they have any questions or concerns and appropriately addressed them to the best of my ability .      > 15  minutes spent in direct patient care reviewing  the notes, lab data/ imaging , discussion with multidisciplinary team. All questions were addressed and answered to the best of my capacity .    Thank you for allowing me to participate in the care of your patient .        Cristopher Franklin MD  [phone]

## 2019-06-03 NOTE — PROGRESS NOTE ADULT - PROBLEM SELECTOR PLAN 5
continue home medications , tele monitoring
- AF - brief episode yesterday. If recurrence, will consider adding anticoagulationas per card consult Monitor electrolytes ,check TSH
continue home medications , tele monitoring

## 2019-06-03 NOTE — PROGRESS NOTE ADULT - SUBJECTIVE AND OBJECTIVE BOX
PROGRESS NOTE  Patient is a 89y old  Male who presents with a chief complaint of cough and sob (03 Jun 2019 07:31)    Chart and available morning labs /imaging are reviewed electronically , urgent issues addressed . More information  is being added upon completion of rounds , when more information is collected and management discussed with consultants , medical staff and social service/case management on the floor   OVERNIGHT  HR up to 122 is noted ,as per med staff ,patient was ambulating in a room during episode and didnt have any complains ,card cons is aware and clearance requested for d/c   Patient is resting in a bed comfortably  .No distress noted Oxygen is being arranged by    HPI:  89 yo male ,[facility] [place] resident with hx of bladder cancer , bladder polyps , prostate cancer , BPH ,cataract ,obesity , urinary retention , HTN , CAD ,LBBB, HLD , PREDIABETES , DEPRESSION ,DEMENTIA presents to ED with cough and chest congestion x 2-3 days CTT showed LLL PNEUMONIA Admitted for septic workup and evaluation,send blood and urine cx,serial lactate levels,monitor vitals closley,ivfs hydration,monitor urine output and renal profile,iv abx initiated -started on zosyn in ER Pulmonology consult called Found to have LBBB on EKG ( known from  2013 ) ,cardiology consult called .Found to have BNP elevation ,but no PVC or signs of fluid overload Admit to monitored unit for cardiac monitoring, obtain echo to evaluate LVEF, monitor ins/outs, monitor renal profile and electrolytes closely, cardiology consult ,send serial bnp , O2 supply, serial chest xrays, monitor weights and oral intake of fluids, nutritionist consult Palliative care consult requested ,to discuss advance directives and complete MOLST Found to have troponin elevation ,likely related to renal insufficiency and demand ischemia 2/2 to pneumonia  Admitted  to telemetry unit for monitoring , send 3 sets of cardiac ensymes to rule out coronary event, obtain ECHO to evaluate LVEF, cardiology consult ,continue current management, O2 supply, anticoagulation plan as per cardiology consult (29 May 2019 00:07)    PAST MEDICAL & SURGICAL HISTORY:  Bladder cancer  Obesity: BMI = 30.2  LBBB (left bundle branch block): noted on  EKG 2013  Bladder polyps  Prostate cancer: diagnosed in 2003; pt received radioactive seeds  BPH (benign prostatic hyperplasia)  HTN (hypertension)  Ureteric obstruction  Prediabetes  H/O: hypertension  S/P cataract surgery, left  Bladder polyps: removed  S/P cystoscopy: multiple cystoscopies over 10 years with removal of  bladder polyps  S/P tonsillectomy: as a child  Chest wall symptom: s/p reomval of shrapnel from right chest wall in 1952      MEDICATIONS  (STANDING):  amoxicillin  500 milliGRAM(s)/clavulanate 1 Tablet(s) Oral two times a day  aspirin  chewable 81 milliGRAM(s) Oral daily  atorvastatin 10 milliGRAM(s) Oral at bedtime  buDESOnide   0.5 milliGRAM(s) Respule 0.5 milliGRAM(s) Inhalation two times a day  dextrose 5%. 1000 milliLiter(s) (50 mL/Hr) IV Continuous <Continuous>  dextrose 50% Injectable 12.5 Gram(s) IV Push once  dextrose 50% Injectable 25 Gram(s) IV Push once  dextrose 50% Injectable 25 Gram(s) IV Push once  doxazosin 4 milliGRAM(s) Oral at bedtime  furosemide    Tablet 20 milliGRAM(s) Oral daily  guaiFENesin ER 1200 milliGRAM(s) Oral every 12 hours  heparin  Injectable 5000 Unit(s) SubCutaneous every 12 hours  insulin lispro (HumaLOG) corrective regimen sliding scale   SubCutaneous three times a day before meals  insulin lispro (HumaLOG) corrective regimen sliding scale   SubCutaneous at bedtime  lactobacillus acidophilus 1 Tablet(s) Oral every 8 hours  memantine 5 milliGRAM(s) Oral two times a day  metoprolol tartrate 25 milliGRAM(s) Oral two times a day  multivitamin 1 Tablet(s) Oral daily  pantoprazole    Tablet 40 milliGRAM(s) Oral before breakfast  venlafaxine 37.5 milliGRAM(s) Oral daily    MEDICATIONS  (PRN):  acetaminophen   Tablet .. 650 milliGRAM(s) Oral every 6 hours PRN Temp greater or equal to 38C (100.4F), Mild Pain (1 - 3)  ALBUTerol/ipratropium for Nebulization 3 milliLiter(s) Nebulizer every 6 hours PRN Shortness of Breath and/or Wheezing  dextrose 40% Gel 15 Gram(s) Oral once PRN Blood Glucose LESS THAN 70 milliGRAM(s)/deciliter  glucagon  Injectable 1 milliGRAM(s) IntraMuscular once PRN Glucose LESS THAN 70 milligrams/deciliter  guaiFENesin   Syrup  (Sugar-Free) 200 milliGRAM(s) Oral every 8 hours PRN Cough  haloperidol    Injectable 0.5 milliGRAM(s) IntraMuscular every 6 hours PRN Agitation      OBJECTIVE    T(C): 37.1 (06-03-19 @ 05:00), Max: 37.1 (06-03-19 @ 05:00)  HR: 90 (06-03-19 @ 07:43) (80 - 122)  BP: 110/61 (06-03-19 @ 05:27) (91/61 - 166/61)  RR: 17 (06-03-19 @ 05:00) (17 - 19)  SpO2: 98% (06-03-19 @ 07:43) (93% - 98%)  Wt(kg): --  I&O's Summary    02 Jun 2019 07:01  -  03 Jun 2019 07:00  --------------------------------------------------------  IN: 0 mL / OUT: 500 mL / NET: -500 mL          REVIEW OF SYSTEMS:  CONSTITUTIONAL: No fever, weight loss, or fatigue  EYES: No eye pain, visual disturbances, or discharge  ENMT:   No sinus or throat pain  NECK: No pain or stiffness  RESPIRATORY: No cough, wheezing, chills or hemoptysis; No shortness of breath  CARDIOVASCULAR: No chest pain, palpitations, dizziness, or leg swelling  GASTROINTESTINAL: No abdominal pain. No nausea, vomiting; No diarrhea or constipation. No melena or hematochezia.  GENITOURINARY: No dysuria, frequency, hematuria, or incontinence  NEUROLOGICAL: No headaches, memory loss, loss of strength, numbness, or tremors  SKIN: No itching, burning, rashes, or lesions   MUSCULOSKELETAL: No joint pain or swelling; No muscle, back, or extremity pain    PHYSICAL EXAM:  Appearance: NAD. VS past 24 hrs -as above   HEENT:   Moist oral mucosa. Conjunctiva clear b/l.   Neck : supple  Respiratory: Lungs CTAB.  Gastrointestinal:  Soft, nontender. No rebound. No rigidity. BS present	  Cardiovascular: RRR ,S1S2 present  Neurologic: Non-focal. Moving all extremities.  Extremities: No edema. No erythema. No calf tenderness.  Skin: No rashes, No ecchymoses, No cyanosis.	  wounds ,skin lesions-See skin assesment flow sheet   LABS:    06-02    141  |  105  |  21  ----------------------------<  79  3.6   |  27  |  1.26    Ca    9.1      02 Jun 2019 07:39  Mg     2.1     06-02      CAPILLARY BLOOD GLUCOSE      POCT Blood Glucose.: 105 mg/dL (03 Jun 2019 07:47)  POCT Blood Glucose.: 112 mg/dL (02 Jun 2019 21:24)  POCT Blood Glucose.: 117 mg/dL (02 Jun 2019 21:16)  POCT Blood Glucose.: 131 mg/dL (02 Jun 2019 16:40)  POCT Blood Glucose.: 111 mg/dL (02 Jun 2019 12:15)          Culture - Blood (collected 29 May 2019 13:22)  Source: .Blood  Preliminary Report (30 May 2019 14:01):    No growth to date.    Culture - Blood (collected 29 May 2019 13:22)  Source: .Blood  Preliminary Report (30 May 2019 14:01):    No growth to date.    Culture - Urine (collected 29 May 2019 12:40)  Source: .Urine  Final Report (30 May 2019 10:53):    No growth      RADIOLOGY & ADDITIONAL TESTS:< from: Xray Chest 1 View- PORTABLE-Routine (05.31.19 @ 12:38) >  EXAM:  XR CHEST PORTABLE ROUTINE 1V                                  PROCEDURE DATE:  05/31/2019          INTERPRETATION:  AP chest on May 31, 2019 at 12:28 PM. Prior study of May   28 showed pulmonary vascular congestion.    On present examinationheart is magnified by technique.    Mild vascular congestion at the lung bases again noted.    Chest is similar to May 28.    IMPRESSION: Persistent mild pulmonary vascular congestion.    < end of copied text >  < from: Xray Chest 1 View- PORTABLE-Routine (05.31.19 @ 12:38) >  EXAM:  XR CHEST PORTABLE ROUTINE 1V                                  PROCEDURE DATE:  05/31/2019          INTERPRETATION:  AP chest on May 31, 2019 at 12:28 PM. Prior study of May   28 showed pulmonary vascular congestion.    On present examinationheart is magnified by technique.    Mild vascular congestion at the lung bases again noted.    Chest is similar to May 28.    IMPRESSION: Persistent mild pulmonary vascular congestion.    < end of copied text >     reviewed elctronically  ASSESSMENT/PLAN: 	    Patient was seen and examined on a day of discharge . Plan of care , discharge medications and recommendations discussed with consultants and clearance for discharge obtained .Social service , case management  and medical staff are aware of plan. Family is notified. Discharge summary  is  prepared electronically

## 2019-06-03 NOTE — PROGRESS NOTE ADULT - PROBLEM SELECTOR PROBLEM 2
HTN (hypertension)
CAD (coronary artery disease)
Delirium
HTN (hypertension)
ARF likely 2/2 to ischemic ATN with hypoperfusion in setting of shock this admission. Now on HD through R sided Permacath.   - currently being considered for AVF placement pending medical optimization for surgical procedure. Patient is currently not medically optimized and requires continued rehabilitation prior to surgical consideration.  - c/w intermittent HD  - avoid nephrotoxic agents  - renal following
Delirium

## 2019-06-03 NOTE — PROGRESS NOTE ADULT - SUBJECTIVE AND OBJECTIVE BOX
Patient is a 89y Male with a known history of :  CHF (congestive heart failure) (I50.9)  Delirium (R41.0)  Atrial fibrillation (I48.91)  Renal insufficiency (N28.9)  Troponin I above reference range (R74.8)  Prophylactic measure (Z29.9)  OLIVIA (acute kidney injury) (N17.9)  HTN (hypertension) (I10)  CAD (coronary artery disease) (I25.10)  Pneumonia of lower lobe due to infectious organism (J18.1)    HPI:  89 yo male ,[place] resident with hx of bladder cancer , bladder polyps , prostate cancer , BPH ,cataract ,obesity , urinary retention , HTN , CAD ,LBBB, HLD , PREDIABETES , DEPRESSION ,DEMENTIA presents to ED with cough and chest congestion x 2-3 days CTT showed LLL PNEUMONIA Admitted for septic workup and evaluation,send blood and urine cx,serial lactate levels,monitor vitals closley,ivfs hydration,monitor urine output and renal profile,iv abx initiated -started on zosyn in ER Pulmonology consult called Found to have LBBB on EKG ( known from  2013 ) ,cardiology consult called .Found to have BNP elevation ,but no PVC or signs of fluid overload Admit to monitored unit for cardiac monitoring, obtain echo to evaluate LVEF, monitor ins/outs, monitor renal profile and electrolytes closely, cardiology consult ,send serial bnp , O2 supply, serial chest xrays, monitor weights and oral intake of fluids, nutritionist consult Palliative care consult requested ,to discuss advance directives and complete MOLST Found to have troponin elevation ,likely related to renal insufficiency and demand ischemia 2/2 to pneumonia  Admitted  to telemetry unit for monitoring , send 3 sets of cardiac ensymes to rule out coronary event, obtain ECHO to evaluate LVEF, cardiology consult ,continue current management, O2 supply, anticoagulation plan as per cardiology consult (29 May 2019 00:07)      REVIEW OF SYSTEMS:    CONSTITUTIONAL: No fever, weight loss, or fatigue  EYES: No eye pain, visual disturbances, or discharge  ENMT:  No difficulty hearing, tinnitus, vertigo; No sinus or throat pain  NECK: No pain or stiffness  BREASTS: No pain, masses, or nipple discharge  RESPIRATORY: No cough, wheezing, chills or hemoptysis; No shortness of breath  CARDIOVASCULAR: No chest pain, palpitations, dizziness, or leg swelling  GASTROINTESTINAL: No abdominal or epigastric pain. No nausea, vomiting, or hematemesis; No diarrhea or constipation. No melena or hematochezia.  GENITOURINARY: No dysuria, frequency, hematuria, or incontinence  NEUROLOGICAL: No headaches, memory loss, loss of strength, numbness, or tremors  SKIN: No itching, burning, rashes, or lesions   LYMPH NODES: No enlarged glands  ENDOCRINE: No heat or cold intolerance; No hair loss  MUSCULOSKELETAL: No joint pain or swelling; No muscle, back, or extremity pain  PSYCHIATRIC: No depression, anxiety, mood swings, or difficulty sleeping  HEME/LYMPH: No easy bruising, or bleeding gums  ALLERGY AND IMMUNOLOGIC: No hives or eczema    MEDICATIONS  (STANDING):  amoxicillin  500 milliGRAM(s)/clavulanate 1 Tablet(s) Oral two times a day  aspirin  chewable 81 milliGRAM(s) Oral daily  atorvastatin 10 milliGRAM(s) Oral at bedtime  buDESOnide   0.5 milliGRAM(s) Respule 0.5 milliGRAM(s) Inhalation two times a day  dextrose 5%. 1000 milliLiter(s) (50 mL/Hr) IV Continuous <Continuous>  dextrose 50% Injectable 12.5 Gram(s) IV Push once  dextrose 50% Injectable 25 Gram(s) IV Push once  dextrose 50% Injectable 25 Gram(s) IV Push once  doxazosin 4 milliGRAM(s) Oral at bedtime  furosemide    Tablet 20 milliGRAM(s) Oral daily  guaiFENesin ER 1200 milliGRAM(s) Oral every 12 hours  heparin  Injectable 5000 Unit(s) SubCutaneous every 12 hours  insulin lispro (HumaLOG) corrective regimen sliding scale   SubCutaneous three times a day before meals  insulin lispro (HumaLOG) corrective regimen sliding scale   SubCutaneous at bedtime  lactobacillus acidophilus 1 Tablet(s) Oral every 8 hours  memantine 5 milliGRAM(s) Oral two times a day  metoprolol tartrate 25 milliGRAM(s) Oral two times a day  multivitamin 1 Tablet(s) Oral daily  pantoprazole    Tablet 40 milliGRAM(s) Oral before breakfast  venlafaxine 37.5 milliGRAM(s) Oral daily    MEDICATIONS  (PRN):  acetaminophen   Tablet .. 650 milliGRAM(s) Oral every 6 hours PRN Temp greater or equal to 38C (100.4F), Mild Pain (1 - 3)  ALBUTerol/ipratropium for Nebulization 3 milliLiter(s) Nebulizer every 6 hours PRN Shortness of Breath and/or Wheezing  dextrose 40% Gel 15 Gram(s) Oral once PRN Blood Glucose LESS THAN 70 milliGRAM(s)/deciliter  glucagon  Injectable 1 milliGRAM(s) IntraMuscular once PRN Glucose LESS THAN 70 milligrams/deciliter  guaiFENesin   Syrup  (Sugar-Free) 200 milliGRAM(s) Oral every 8 hours PRN Cough  haloperidol    Injectable 0.5 milliGRAM(s) IntraMuscular every 6 hours PRN Agitation      ALLERGIES: No Known Drug Allergies  seasonal allergies (Other)      FAMILY HISTORY:      Social history:  Alochol:   Smoking:   Drug Use:   Marital Status:     PHYSICAL EXAMINATION:  -----------------------------  T(C): 36.6 (06-03-19 @ 09:25), Max: 37.1 (06-03-19 @ 05:00)  HR: 84 (06-03-19 @ 09:25) (80 - 122)  BP: 122/57 (06-03-19 @ 09:25) (91/61 - 166/61)  RR: 24 (06-03-19 @ 09:25) (17 - 24)  SpO2: 95% (06-03-19 @ 09:25) (93% - 98%)  Wt(kg): --    06-02 @ 07:01  -  06-03 @ 07:00  --------------------------------------------------------  IN:  Total IN: 0 mL    OUT:    Voided: 500 mL  Total OUT: 500 mL    Total NET: -500 mL            Constitutional: well developed, normal appearance, well groomed, well nourished, no deformities and no acute distress.   Eyes: the conjunctiva exhibited no abnormalities and the eyelids demonstrated no xanthelasmas.   HEENT: normal oral mucosa, no oral pallor and no oral cyanosis.   Neck: normal jugular venous A waves present, normal jugular venous V waves present and no jugular venous fournier A waves.   Pulmonary: no respiratory distress, normal respiratory rhythm and effort, no accessory muscle use and lungs were clear to auscultation bilaterally. Anteriorly  Cardiovascular: heart rate and rhythm were irreg/irreg, normal S1 and S2 and no murmur, gallop, rub, heave or thrill are present.    Musculoskeletal: the gait could not be assessed.   Extremities: no clubbing of the fingernails, no localized cyanosis, no petechial hemorrhages and no ischemic changes.   Skin: normal skin color and pigmentation, no rash, no venous stasis, no skin lesions, no skin ulcer and no xanthoma was observed.    LABS:   --------  06-02    141  |  105  |  21  ----------------------------<  79  3.6   |  27  |  1.26    Ca    9.1      02 Jun 2019 07:39  Mg     2.1     06-02                     Radiology:

## 2019-06-03 NOTE — PROGRESS NOTE ADULT - PROBLEM SELECTOR PROBLEM 3
CAD (coronary artery disease)
Atrial fibrillation
CAD (coronary artery disease)
Atrial fibrillation
CAD (coronary artery disease)
HTN (hypertension)

## 2019-06-03 NOTE — PROGRESS NOTE ADULT - ASSESSMENT
PATIENT NEEDLE OLIVIA [facility] S 150 56 84  3/16/1929 DOA 2019 DR ELISSA BECKHAM    DISPOSITION/PLAN    89 [facility] admitted [facility] S 2019 with mf pneum poss chf 2019 CT ch mf pneumesp lll and lingula     PMH Bladder ca BPH LBBB Obesity Prostate ca  sp radioactive seeds ureteric obstructio    ASP PNEUM On zosyn () Vanco dced 2019 as mrsa p n  procal  Will recheck    DYSPHAGIA MBS 2019 dys 3 nectar recommended  OLIVIA  US renal no hydroia  CHF SEC SEVERE AS MILD MS MOD MR PH   2019 cardio following felt pt not decompensated     TIME SPENT Over 25 minutes aggregate care time spent on encounter; activities included   direct pt care, counseling and/or coordinating care reviewing notes, lab data/ imaging , discussion with multidisciplinary team/ pt /family. Risks, benefits, alternatives  discussed in detail
89 yo male ,[place] resident with hx of bladder cancer , bladder polyps , prostate cancer , BPH ,cataract ,obesity , urinary retention , HTN , CAD ,LBBB, HLD , PREDIABETES , DEPRESSION ,DEMENTIA presents to ED with cough and chest congestion x 2-3 days CTT showed LLL PNEUMONIA now with ckd and gene
89 yo male ,[facility] resident with hx of bladder cancer , bladder polyps , prostate cancer , BPH ,cataract ,obesity , urinary retention , HTN , CAD ,LBBB, HLD , PREDIABETES , DEPRESSION ,DEMENTIA presents to ED with cough and chest congestion x 2-3 days CTT showed LLL PNEUMONIA Admitted for septic workup and evaluation,send blood and urine cx,serial lactate levels,monitor vitals closley,ivfs hydration,monitor urine output and renal profile,iv abx initiated -started on zosyn in ER Pulmonology consult called Found to have LBBB on EKG ( known from  2013 ) ,cardiology consult called .Found to have BNP elevation ,but no PVC or signs of fluid overload Admit to monitored unit for cardiac monitoring, obtain echo to evaluate LVEF, monitor ins/outs, monitor renal profile and electrolytes closely, cardiology consult ,send serial bnp , O2 supply, serial chest xrays, monitor weights and oral intake of fluids, nutritionist consult Palliative care consult requested ,to discuss advance directives and complete MOLST Found to have troponin elevation ,likely related to renal insufficiency and demand ischemia 2/2 to pneumonia  Admitted  to telemetry unit for monitoring , send 3 sets of cardiac ensymes to rule out coronary event, obtain ECHO to evaluate LVEF, cardiology consult ,continue current management, O2 supply, anticoagulation plan as per cardiology consult
89 yo male ,[facility] resident with hx of bladder cancer , bladder polyps , prostate cancer , BPH ,cataract ,obesity , urinary retention , HTN , CAD ,LBBB, HLD , PREDIABETES , DEPRESSION ,DEMENTIA presents to ED with cough and chest congestion x 2-3 days CTT showed LLL PNEUMONIA Admitted for septic workup and evaluation,send blood and urine cx,serial lactate levels,monitor vitals closley,ivfs hydration,monitor urine output and renal profile,iv abx initiated -started on zosyn in ER Pulmonology consult called Found to have LBBB on EKG ( known from  2013 ) ,cardiology consult called .Found to have BNP elevation ,but no PVC or signs of fluid overload Admit to monitored unit for cardiac monitoring, obtain echo to evaluate LVEF, monitor ins/outs, monitor renal profile and electrolytes closely, cardiology consult ,send serial bnp , O2 supply, serial chest xrays, monitor weights and oral intake of fluids, nutritionist consult Palliative care consult requested ,to discuss advance directives and complete MOLST Found to have troponin elevation ,likely related to renal insufficiency and demand ischemia 2/2 to pneumonia  Admitted  to telemetry unit for monitoring , send 3 sets of cardiac ensymes to rule out coronary event, obtain ECHO to evaluate LVEF, cardiology consult ,continue current management, O2 supply, anticoagulation plan as per cardiology consult
89 yo male ,[facility] resident with hx of bladder cancer , bladder polyps , prostate cancer , BPH ,cataract ,obesity , urinary retention , HTN , CAD ,LBBB, HLD , PREDIABETES , DEPRESSION ,DEMENTIA presents to ED with cough and chest congestion x 2-3 days CTT showed LLL PNEUMONIA Admitted for septic workup and evaluation,send blood and urine cx,serial lactate levels,monitor vitals closley,ivfs hydration,monitor urine output and renal profile,iv abx initiated -started on zosyn in ER Pulmonology consult called Found to have LBBB on EKG ( known from  2013 ) ,cardiology consult called .Found to have BNP elevation ,but no PVC or signs of fluid overload Admit to monitored unit for cardiac monitoring, obtain echo to evaluate LVEF, monitor ins/outs, monitor renal profile and electrolytes closely, cardiology consult ,send serial bnp , O2 supply, serial chest xrays, monitor weights and oral intake of fluids, nutritionist consult Palliative care consult requested ,to discuss advance directives and complete MOLST Found to have troponin elevation ,likely related to renal insufficiency and demand ischemia 2/2 to pneumonia  Admitted  to telemetry unit for monitoring , send 3 sets of cardiac ensymes to rule out coronary event, obtain ECHO to evaluate LVEF, cardiology consult ,continue current management, O2 supply, anticoagulation plan as per cardiology consult
89 yo male ,[facility] resident with hx of bladder cancer , bladder polyps , prostate cancer , BPH ,cataract ,obesity , urinary retention , HTN , CAD ,LBBB, HLD , PREDIABETES , DEPRESSION ,DEMENTIA presents to ED with cough and chest congestion x 2-3 days CTT showed LLL PNEUMONIA Admitted for septic workup and evaluation,send blood and urine cx,serial lactate levels,monitor vitals closley,ivfs hydration,monitor urine output and renal profile,iv abx initiated -started on zosyn in ER Pulmonology consult called Found to have LBBB on EKG ( known from  2013 ) ,cardiology consult called .Found to have BNP elevation ,but no PVC or signs of fluid overload Admit to monitored unit for cardiac monitoring, obtain echo to evaluate LVEF, monitor ins/outs, monitor renal profile and electrolytes closely, cardiology consult ,send serial bnp , O2 supply, serial chest xrays, monitor weights and oral intake of fluids, nutritionist consult Palliative care consult requested ,to discuss advance directives and complete MOLST Found to have troponin elevation ,likely related to renal insufficiency and demand ischemia 2/2 to pneumonia  Admitted  to telemetry unit for monitoring , send 3 sets of cardiac ensymes to rule out coronary event, obtain ECHO to evaluate LVEF, cardiology consult ,continue current management, O2 supply, anticoagulation plan as per cardiology consult
89 yo male ,[facility] resident with hx of bladder cancer , bladder polyps , prostate cancer , BPH ,cataract ,obesity , urinary retention , HTN , CAD ,LBBB, HLD , PREDIABETES , DEPRESSION ,DEMENTIA presents to ED with cough and chest congestion x 2-3 days CTT showed LLL PNEUMONIA now with ckd and gene
89 yo male ,long-term resident with hx of bladder cancer , bladder polyps , prostate cancer , BPH ,cataract ,obesity , urinary retention , HTN , CAD ,LBBB, HLD , PREDIABETES , DEPRESSION ,DEMENTIA presents to ED with cough and chest congestion x 2-3 days CTT showed LLL PNEUMONIA now with ckd and gene
89 yo male ,[facility] with hx of bladder cancer , bladder polyps , prostate cancer , BPH ,cataract ,obesity , urinary retention , HTN , CAD ,LBBB, HLD , PREDIABETES , DEPRESSION ,DEMENTIA presents to ED with cough and chest congestion x 2-3 days CTT showed LLL PNEUMONIA now with ckd and gene
The patient is a 89 year old male with a history of HTN, HL, bladder cancer, CAD, LBBB, dementia who presents with cough and congestion.     6/1/19  Patient lying flat, sleeping comfortably  Easily arousable and no complaints offered  Earlier may have had run of rapid A-Fib with ambulation    Plan:  - ECG with LBBB which is old  - Troponin peaked at 1.217 likely in the setting of pneumonia. No need to trend further.  - Continue aspirin 81 mg daily  - Echocardiogram with severe LV systolic dysfunction, severe AS  - CT chest with multifocal PNA  - [place]NP elevated at 197[address] although no evidence of decompensated heart failure on exam  - Continue metoprolol tartrate 25 mg bid  - AF - brief episode. If recurrence, will consider adding anticoagulation.  - Can consider adding ACEI/ARB as outpatient (patient has some degree of renal insufficiency).  - Being followed by Pulmonary, ID. Nephrology  - Discharge planning
The patient is a 89 year old male with a history of HTN, HL, bladder cancer, CAD, LBBB, dementia who presents with cough and congestion.     6/2/19  Patient sitting in chair  No complaints offered  Earlier may have had run of short NSVT and given Mag    Plan:  - ECG with LBBB which is old  - Troponin peaked at 1.217 likely in the setting of pneumonia. No need to trend further.  - Continue aspirin 81 mg daily  - Echocardiogram with severe LV systolic dysfunction, severe AS  - CT chest with multifocal pneumonia  - BNP elevated at 65452 although no evidence of decompensated heart failure on exam  - Continue metoprolol tartrate 25 mg bid  - AF - brief episode. If recurrence, will consider adding anticoagulation.  - Can consider adding ACEI/ARB as outpatient (patient has some degree of renal insufficiency).  - Being followed by Pulmonary, ID. Nephrology  - Discharge planning
The patient is a 89 year old male with a history of HTN, HL, bladder cancer, CAD, LBBB, dementia who presents with cough and congestion.     6/3/19  Patient sitting in chair  No complaints offered  Daughter at bedside  Monitor compatible with probably A-Fib    Plan:  - ECG with LBBB which is old  - Troponin peaked at 1.217 likely in the setting of pneumonia. No need to trend further.  - Continue aspirin 81 mg daily  - Echocardiogram with severe LV systolic dysfunction, severe AS  - CT chest with multifocal pneumonia  - BNP elevated at 8[address]56 although no evidence of decompensated heart failure on exam  - Continue metoprolol tartrate 25 mg bid  - Risks, benefits, diagnosis and prognosis explained to daughter including CVA, etc and presently she prefers not to start anticoagulate yet and to be re-evaluated at [facility] or out-side Cardiologist  - Patient stable from a cardiac stand-point  - Can consider adding ACEI/ARB as outpatient (patient has some degree of renal insufficiency).  - Being followed by Pulmonary, ID. Nephrology  - Discharge planning
The patient is a 89 year old male with a history of HTN, HL, bladder cancer, CAD, LBBB, dementia who presents with cough and congestion.     Plan:  - ECG with LBBB which is old  - Troponin peaked at 1.217 likely in the setting of PNA. No need to trend further.  - Continue aspirin 81 mg daily  - Echocardiogram pending  - CT chest with multifocal PNA  - BNP elevated at 059[address] although no evidence of decompensated heart failure on exam  - On zosyn and vancomycin  - Continue metoprolol tartrate 25 mg bid  - AF - brief episode yesterday. If recurrence, will consider adding anticoagulation.  - Pulm follow-up
The patient is a 89 year old male with a history of HTN, HL, bladder cancer, CAD, LBBB, dementia who presents with cough and congestion.     Plan:  - ECG with LBBB which is old  - Troponin peaked at 1.217 likely in the setting of PNA. No need to trend further.  - Continue aspirin 81 mg daily  - Echocardiogram with severe LV systolic dysfunction, severe AS  - CT chest with multifocal PNA  - [place]NP elevated at 372[address] although no evidence of decompensated heart failure on exam  - Continue metoprolol tartrate 25 mg bid  - AF - brief episode yesterday. If recurrence, will consider adding anticoagulation.  - BP on low side. Can consider adding ACEI/ARB as outpatient.  - Discharge planning
PATIENT NEEDLE OLIVIA [facility] S 150 56 84  3/16/1929 DOA 2019 DR ELISSA BECKHAM    DISPOSITION/PLAN    89 m from [facility] with chest congestion admitted [facility] S 2019 with mf pneum poss chf 2019 CT ch mf pneumesp lll and lingula     PMH Bladder ca BPH LBBB Obesity Prostate ca  sp radioactive seeds ureteric obstructio    ASP PNEUM   sp zosyn (-) Changed to augmentin    Vanco dced 2019 as mrsa p n    procal 29   2019 procalc improving   NEED FOR HOME OXYGEN   2019 Home O2 being arranged Likely will be available 6/3  DYSPHAGIA MBS 2019 dys 3 nectar recommended  OLIVIA   renal no hydroia C --2019   Cr 1.9 - 1.6-1.4 Improved   S CHF SEC SEVERE AS MILD MS MOD MR PH   2019 cardio following felt pt not decompensated   2019 Is on lasix   Cleared for dc pulm standpoint if id and cardio agree     TIME SPENT Over 25 minutes aggregate care time spent on encounter; activities included   direct pt care, counseling and/or coordinating care reviewing notes, lab data/ imaging , discussion with multidisciplinary team/ pt /family. Risks, benefits, alternatives  discussed in detail.
PATIENT NEEDLE OLIVIA [facility] S 150 56 84  3/16/1929 DOA 2019 DR ELISSA BECKHAM    DISPOSITION/PLAN    89 [place] from [place] with chest congestion admitted [facility] S 2019 with mf pneum poss chf 2019 CT ch mf pneumesp lll and lingula     PMH Bladder ca BPH LBBB Obesity Prostate ca 2003 sp radioactive seeds ureteric obstructio    ASP PNEUM On zosyn () Vanco dced 2019 as mrsa p n  procal 29 Will recheck 2019 procalc improving   DYSPHAGIA MBS 2019 dys 3 nectar recommended  OLIVIA  US renal no hydroia  CHF SEC SEVERE AS MILD MS MOD MR PH   2019 cardio following felt pt not decompensated   Cleared for dc pulm standpoint if id and cardio agree     TIME SPENT Over 25 minutes aggregate care time spent on encounter; activities included   direct pt care, counseling and/or coordinating care reviewing notes, lab data/ imaging , discussion with multidisciplinary team/ pt /family. Risks, benefits, alternatives  discussed in detail
PATIENT NEEDLE OLIVIA [facility] S 150 56 84  3/16/1929 DOA 2019 DR ELISSA BECKHAM    DISPOSITION/PLAN    89 m from [facility] with chest congestion admitted [facility] S 2019 with mf pneum poss chf 2019 CT ch mf pneumesp lll and lingula     PMH Bladder ca BPH LBBB Obesity Prostate ca 2003 sp radioactive seeds ureteric obstructio    ASP PNEUM On zosyn () Vanco dced 2019 as mrsa p n  procal  Will recheck 2019 procalc improving   DYSPHAGIA MBS 2019 dys 3 nectar recommended  OLIVIA  US renal no hydroia  S CHF SEC SEVERE AS MILD MS MOD MR PH   2019 cardio following felt pt not decompensated   2019 Is on lasix   Cleared for dc pulm standpoint if id and cardio agree     TIME SPENT Over 25 minutes aggregate care time spent on encounter; activities included   direct pt care, counseling and/or coordinating care reviewing notes, lab data/ imaging , discussion with multidisciplinary team/ pt /family. Risks, benefits, alternatives  discussed in detail.
PATIENT NEEDLE OLIVIA [facility] S 150 56 84  3/16/1929 DOA 2019 DR ELISSA BECKHAM    DISPOSITION/PLAN    89 m from [facility] with chest congestion admitted [facility] S 2019 with mf pneum poss chf 2019 CT ch mf pneumesp lll and lingula     PMH Bladder ca BPH LBBB Obesity Prostate ca 2003 sp radioactive seeds ureteric obstructio    ASP PNEUM On zosyn () Vanco dced 2019 as mrsa p n  procal  Will recheck 2019 procalc improving   NEED FOR HOME OXYGEN   2019 Home O2 being arranged Likely will be available 6/3  DYSPHAGIA MBS 2019 dys 3 nectar recommended  OLIVIA  US renal no hydroia  S CHF SEC SEVERE AS MILD MS MOD MR PH   2019 cardio following felt pt not decompensated   2019 Is on lasix   Cleared for dc pulm standpoint if id and cardio agree     TIME SPENT Over 25 minutes aggregate care time spent on encounter; activities included   direct pt care, counseling and/or coordinating care reviewing notes, lab data/ imaging , discussion with multidisciplinary team/ pt /family. Risks, benefits, alternatives  discussed in detail.
89 yo male ,long-term resident with hx of bladder cancer , bladder polyps , prostate cancer , BPH ,cataract ,obesity , urinary retention , HTN , CAD ,LBBB, HLD , PREDIABETES , DEPRESSION ,DEMENTIA presents to ED with cough and chest congestion x 2-3 days CTT showed LLL PNEUMONIA now with ckd and gene
89 yo male ,[facility] resident with hx of bladder cancer , bladder polyps , prostate cancer , BPH ,cataract ,obesity , urinary retention , HTN , CAD ,LBBB, HLD , PREDIABETES , DEPRESSION ,DEMENTIA presents to ED with cough and chest congestion x 2-3 days CTT showed LLL PNEUMONIA Admitted for septic workup and evaluation,send blood and urine cx,serial lactate levels,monitor vitals closley,ivfs hydration,monitor urine output and renal profile,iv abx initiated -started on zosyn in ER Pulmonology consult called Found to have LBBB on EKG ( known from  2013 ) ,cardiology consult called .Found to have BNP elevation ,but no PVC or signs of fluid overload Admit to monitored unit for cardiac monitoring, obtain echo to evaluate LVEF, monitor ins/outs, monitor renal profile and electrolytes closely, cardiology consult ,send serial bnp , O2 supply, serial chest xrays, monitor weights and oral intake of fluids, nutritionist consult Palliative care consult requested ,to discuss advance directives and complete MOLST Found to have troponin elevation ,likely related to renal insufficiency and demand ischemia 2/2 to pneumonia  Admitted  to telemetry unit for monitoring , send 3 sets of cardiac ensymes to rule out coronary event, obtain ECHO to evaluate LVEF, cardiology consult ,continue current management, O2 supply, anticoagulation plan as per cardiology consult

## 2019-06-03 NOTE — PROGRESS NOTE ADULT - ATTENDING COMMENTS
Patient was seen and examined on a day of discharge . Plan of care , discharge medications and recommendations discussed with consultants and clearance for discharge obtained .Social service , case management  and medical staff are aware of plan. Family is notified. Discharge summary  is  prepared electronically DISCHARGE HOME TODAY IF O2 IS DELIVERED AND CLEARED BY CARD CONSULT

## 2019-06-03 NOTE — PROGRESS NOTE ADULT - PROBLEM SELECTOR PLAN 1
ACUTE RENAL FAILURE: continue with diuresis , dc planning in progress    continue with hydration will f/u with bun and cr is improving   Serum creatinine is stable at 1.26     , approximating GFR is increased    ml/min.   There is  progression . No uremic symptoms    pos  evidence of anemia .  Fluid status stable.  Will continue to avoid nephrotoxic drugs.  Patient remains asymptomatic.   Continue current therapy.

## 2019-06-03 NOTE — PROGRESS NOTE ADULT - PROBLEM SELECTOR PROBLEM 1
OLIVIA (acute kidney injury)
Pneumonia of lower lobe due to infectious organism
OLIVIA (acute kidney injury)
Pneumonia of lower lobe due to infectious organism

## 2019-06-03 NOTE — PROGRESS NOTE ADULT - PROBLEM SELECTOR PLAN 2
BP monitoring, continue current antihypertensive meds, low salt diet, followup
HALDOL PRN
BP monitoring, continue current antihypertensive meds, low salt diet, followup
HALDOL PRN
continue current management and present  medications ,card eval is noted - ECG with LBBB which is old  - Troponin peaked at 1.217 likely in the setting of PNA. No need to trend further.  - Continue aspirin 81 mg daily  - Echocardiogram pending ,followed by card cons   - BNP elevated at [address] although no evidence of decompensated CHF as per card   - Continue metoprolol tartrate 25 mg bid  - AF - brief episode yesterday. If recurrence, will consider adding anticoagulation.

## 2019-06-03 NOTE — PROGRESS NOTE ADULT - PROVIDER SPECIALTY LIST ADULT
Cardiology
Hospitalist
Infectious Disease
Internal Medicine
Nephrology
Pulmonology
Infectious Disease
Pulmonology
Nephrology
Nephrology
Hospitalist

## 2019-06-03 NOTE — PROGRESS NOTE ADULT - PROBLEM SELECTOR PLAN 3
f/u  ECHO to evaluate LVEF, cardiology consult, continue current managmnet,O2 supply, anticoagulation plan as per cardiology consult.
- AF - brief episode yesterday. If recurrence, will consider adding anticoagulationas per card consult Monitor electrolytes ,check TSH
continue current managmnet,O2 supply, anticoagulation plan as per cardiology
- AF - brief episode yesterday. If recurrence, will consider adding anticoagulationas per card consult Monitor electrolytes ,check TSH
- AF - brief episode yesterday. If recurrence, will consider adding anticoagulationas per card consult Monitor electrolytes ,check TSH
continue current managmnet,O2 supply, anticoagulation plan as per cardiology
continue current managmnet,O2 supply, anticoagulation plan as per cardiology
continue home medications , tele monitoring
f/u  ECHO to evaluate LVEF, cardiology consult, continue current managmnet,O2 supply, anticoagulation plan as per cardiology consult.
- AF - brief episode yesterday. If recurrence, will consider adding anticoagulationas per card consult Monitor electrolytes ,check TSH

## 2019-06-03 NOTE — PROGRESS NOTE ADULT - PROBLEM SELECTOR PROBLEM 4
CAD (coronary artery disease)
Troponin I above reference range
CAD (coronary artery disease)

## 2019-06-03 NOTE — PROGRESS NOTE ADULT - REASON FOR ADMISSION
cough and sob

## 2019-06-03 NOTE — PROGRESS NOTE ADULT - SUBJECTIVE AND OBJECTIVE BOX
Patient chart was reviewed Workup and management orders based on current assessment have been entered to expedite patient care  Nursing notified for stat orders as applicable Detailed note and further workup and management orders (if needed)  will be entered in the space below / MD order section after patient has been examined Patient chart was reviewed Workup and management orders based on current assessment have been entered to expedite patient care  Nursing notified for stat orders as applicable Detailed note and further workup and management orders (if needed)  will be entered in the space below / MD order section after patient has been examined    ALVARO BAKER [facility] S 150 56 84  3/16/1929 DOA 2019 DR ELISSA DC  ALLERGY     nka   CONTACT     Datr  Mehnaz Vega [id number] [phone] self   Initial evaluation/Pulmonary Critical Care consultation requested on  2019   by Dr Dc  from Dr Obando   Patient examined chart reviewed    HOSPITAL ADMISSION [facility] DR ELISSA DC  PATIENT CAME  FROM (if information available)        TYPE OF VISIT      Subsequent Pulmonary followup     REASON FOR VISIT  For list of problems evaluated/addressed, please see problem list in the note below     PATIENT ALVARO BAKER [facility] S 150 56 84  3/16/1929 [address]A 2019 DR ELISSA DC    VITALS/LABS       6/3/2019 afeb 102 91/61 17   6/3/2019 On O2 93%   2019 afeb 80 150/60 19 98%   2019 Na 141 K 3.6 CO2 27 Cr 1.2   2019 W 7.9 Hb 9.8 Plt 149 Na 142 K 3.6 CO2 24 Cr 1.4     GLOBAL ISSUE/BEST PRACTICE:      PROBLEM: HOB elevation:   y            PROBLEM: Stress ulcer proph:    na                      PROBLEM: VTE prophylaxis:       PROBLEM: Glycemic control:    na  PROBLEM: Nutrition:   Cons carb (2019)  PROBLEM: Advanced directive: na     PROBLEM: Allergies:  na    EVENT MBS 2019 dys 3 nectar recommended    REVIEW OF SYMPTOMS     NOTE Changes if any  in ROS and PE are updated in daily HOSPITAL COURSE below      Able to give ROS  Yes     RELIABLE No   CONSTITUTIONAL Weakness Yes  Chills No Vision changes No  ENDOCRINE No unexplained hair loss No heat or cold intolerance    ALLERGY No hives  Sore throat No   RESP Coughing blood no  Shortness of breath YES   NEURO No Headache  Confusion Pain neck No   CARDIAC No Chest pain No Palpitations   GI No Pain abdomen NO   Vomiting NO     PHYSICAL EXAM    HEENT Unremarkable PERRLA atraumatic   RESP Fair air entry EXP prolonged    Harsh breath sound Resp distres mild   CARDIAC S1 S2 No S3     NO JVD    ABDOMEN SOFT BS PRESENT NOT DISTENDED No hepatosplenomegaly PEDAL EDEMA present No calf tenderness  NO rash   GENERAL Not TOXIC

## 2019-06-03 NOTE — PROGRESS NOTE ADULT - NSHPATTENDINGPLANDISCUSS_GEN_ALL_CORE
med staff ,  VICENTE Pierce , card consult Dr Horvath , Dr Obando , also I spoke to the daughter over the weekend ,aware of plan

## 2019-06-03 NOTE — PROGRESS NOTE ADULT - PROBLEM SELECTOR PLAN 4
continue current management and present  medications ,card eval is noted - ECG with LBBB which is old  - Troponin peaked at 1.217 likely in the setting of PNA. No need to trend further.  - Continue aspirin 81 mg daily  - Echocardiogram pending ,followed by card cons   - BNP elevated at 69[address] although no evidence of decompensated CHF as per card   - Continue metoprolol tartrate 25 mg bid  - AF - brief episode yesterday. If recurrence, will consider adding anticoagulation.
- Troponin peaked at 1.217 , No need to trend further as per card ,likely it is related  2/2 to renal insufficiency and demand ischemia due to pneumonia
continue current management and present  medications ,card eval is noted - ECG with LBBB which is old  - Troponin peaked at 1.217 likely in the setting of PNA. No need to trend further.  - Continue aspirin 81 mg daily  - Echocardiogram pending ,followed by card cons   - BNP elevated at 11767 although no evidence of decompensated CHF as per card   - Continue metoprolol tartrate 25 mg bid  - AF - brief episode yesterday. If recurrence, will consider adding anticoagulation.
continue current management and present  medications ,card eval is noted - ECG with LBBB which is old  - Troponin peaked at 1.217 likely in the setting of PNA. No need to trend further.  - Continue aspirin 81 mg daily  - Echocardiogram pending ,followed by card cons   - BNP elevated at 26437 although no evidence of decompensated CHF as per card   - Continue metoprolol tartrate 25 mg bid  - AF - brief episode yesterday. If recurrence, will consider adding anticoagulation.
continue current management and present  medications ,card eval is noted - ECG with LBBB which is old  - Troponin peaked at 1.217 likely in the setting of PNA. No need to trend further.  - Continue aspirin 81 mg daily  - Echocardiogram pending ,followed by card cons   - BNP elevated at 92181 although no evidence of decompensated CHF as per card   - Continue metoprolol tartrate 25 mg bid  - AF - brief episode yesterday. If recurrence, will consider adding anticoagulation.

## 2019-06-03 NOTE — PROGRESS NOTE ADULT - SUBJECTIVE AND OBJECTIVE BOX
Patient is a 89y Male whom presented to the hospital with ckd and gene   seen in am nad no fever   , less congested  continue with diuresis dc planning in progress    PAST MEDICAL & SURGICAL HISTORY:  Bladder cancer  Obesity: BMI = 30.2  LBBB (left bundle branch block): noted on  EKG 2013  Bladder polyps  Prostate cancer: diagnosed in 2003; pt received radioactive seeds  BPH (benign prostatic hyperplasia)  HTN (hypertension)  Ureteric obstruction  Prediabetes  H/O: hypertension  S/P cataract surgery, left  Bladder polyps: removed  S/P cystoscopy: multiple cystoscopies over 10 years with removal of  bladder polyps  S/P tonsillectomy: as a child  Chest wall symptom: s/p reomval of shrapnel from right chest wall in 1952      MEDICATIONS  (STANDING):  aspirin  chewable 81 milliGRAM(s) Oral daily  atorvastatin 10 milliGRAM(s) Oral at bedtime  buDESOnide   0.5 milliGRAM(s) Respule 0.5 milliGRAM(s) Inhalation two times a day  dextrose 5%. 1000 milliLiter(s) (50 mL/Hr) IV Continuous <Continuous>  dextrose 50% Injectable 12.5 Gram(s) IV Push once  dextrose 50% Injectable 25 Gram(s) IV Push once  dextrose 50% Injectable 25 Gram(s) IV Push once  doxazosin 4 milliGRAM(s) Oral at bedtime  guaiFENesin ER 1200 milliGRAM(s) Oral every 12 hours  heparin  Injectable 5000 Unit(s) SubCutaneous every 12 hours  insulin lispro (HumaLOG) corrective regimen sliding scale   SubCutaneous three times a day before meals  insulin lispro (HumaLOG) corrective regimen sliding scale   SubCutaneous at bedtime  lactobacillus acidophilus 1 Tablet(s) Oral every 8 hours  memantine 5 milliGRAM(s) Oral two times a day  metoprolol tartrate 25 milliGRAM(s) Oral two times a day  multivitamin 1 Tablet(s) Oral daily  pantoprazole    Tablet 40 milliGRAM(s) Oral before breakfast  piperacillin/tazobactam IVPB. 3.375 Gram(s) IV Intermittent every 12 hours  sodium chloride 0.9%. 1000 milliLiter(s) (50 mL/Hr) IV Continuous <Continuous>  venlafaxine 37.5 milliGRAM(s) Oral daily      Allergies    No Known Drug Allergies  seasonal allergies (Other)    Intolerances        SOCIAL HISTORY:  Denies ETOh,Smoking,     FAMILY HISTORY:                                                                                                06-02    141  |  105  |  21  ----------------------------<  79  3.6   |  27  |  1.26    Ca    9.1      02 Jun 2019 07:39  Mg     2.1     06-02        CAPILLARY BLOOD GLUCOSE      POCT Blood Glucose.: 113 mg/dL (03 Jun 2019 12:09)  POCT Blood Glucose.: 105 mg/dL (03 Jun 2019 07:47)  POCT Blood Glucose.: 112 mg/dL (02 Jun 2019 21:24)  POCT Blood Glucose.: 117 mg/dL (02 Jun 2019 21:16)      Vital Signs Last 24 Hrs  T(C): 36.6 (03 Jun 2019 14:10), Max: 37.1 (03 Jun 2019 05:00)  T(F): 97.9 (03 Jun 2019 14:10), Max: 98.8 (03 Jun 2019 05:00)  HR: 89 (03 Jun 2019 14:10) (80 - 122)  BP: 134/60 (03 Jun 2019 14:10) (91/61 - 135/72)  BP(mean): 71 (03 Jun 2019 05:00) (71 - 92)  RR: 24 (03 Jun 2019 14:10) (17 - 24)  SpO2: 98% (03 Jun 2019 14:10) (93% - 98%)                     PHYSICAL EXAM:    Constitutional: NAD  HEENT: conjunctive   clear   Neck:  No JVD  Respiratory: decrease bs b/l   Cardiovascular: S1 and S2  Gastrointestinal: BS+, soft, NT/ND  Extremities: No peripheral edema  Neurological:  no focal deficits  Psychiatric: Normal mood, normal affect  : No Howard  Skin: dry   Access: Not applicable

## 2023-06-23 ENCOUNTER — EMERGENCY (EMERGENCY)
Facility: HOSPITAL | Age: 88
LOS: 1 days | End: 2023-06-23
Attending: STUDENT IN AN ORGANIZED HEALTH CARE EDUCATION/TRAINING PROGRAM | Admitting: STUDENT IN AN ORGANIZED HEALTH CARE EDUCATION/TRAINING PROGRAM
Payer: MEDICARE

## 2023-06-23 PROCEDURE — 99284 EMERGENCY DEPT VISIT MOD MDM: CPT

## 2023-06-23 PROCEDURE — 99284 EMERGENCY DEPT VISIT MOD MDM: CPT | Mod: 25

## 2023-06-23 PROCEDURE — 99285 EMERGENCY DEPT VISIT HI MDM: CPT

## 2023-06-23 NOTE — ED PROVIDER NOTE - PROGRESS NOTE DETAILS
Family was brought to bedside, answered all questions.  They will contact  home.  Mitchell Brown MD.

## 2023-06-23 NOTE — ED PROVIDER NOTE - CLINICAL SUMMARY MEDICAL DECISION MAKING FREE TEXT BOX
Patient in cardiac arrest, DNR.  I called and updated daughter over the phone.  She will be in to see him.  They are declining autopsy.  Etiology of cardiac arrest unknown at this time.  Plan to fill out death certificate.  Family will contact  home.

## 2023-06-23 NOTE — INITIAL ORGAN DONATION REFERRAL - NSORGANDONATIONCLINICALTRIGGER_GEN_ALL_CORE
Absence of TWO or more brain stem reflexes/[place] Coma Scale is less than or equal to 5/Family discussion withdrawal of life-sustaining therapies is anticipated

## 2023-06-23 NOTE — ED PROVIDER NOTE - OBJECTIVE STATEMENT
Patient with past medical history of hypertension, hyperlipidemia is presenting with cardiac arrest.  Per EMS, patient was called for unresponsiveness, upon their arrival he was hypotensive and hypoxic.  They received a DNR form from the facility.  While in the ambulance patient had cardiac arrest.  Brought to the hospital and was still in cardiac arrest.  No other interventions were taken given his DNR history.

## 2023-06-23 NOTE — ED PROVIDER NOTE - PHYSICAL EXAMINATION
Constitutional: unresponsive  HEAD: Normocephalic, atraumatic.   EYES: no pupillary reflex b/l  ENT: External ears normal.   CARDIOVASCULAR: asystole  RESPIRATORY: no respirations

## 2025-02-05 NOTE — ED ADULT TRIAGE NOTE - STATUS:
"February 5, 2025     Yann Rucker DO  [address]  [facility] [address]    Patient: Denis Pinzon   YOB: 1950   Date of Visit: 2/5/2025       Dear Dr. Yann Rucker DO:    Thank you for referring Denis Pinzon to me for evaluation. Below are my notes for this consultation.  If you have questions, please do not hesitate to call me. I look forward to following your patient along with you.       Sincerely,     Charlie Vazquez DO      CC: No Recipients  ______________________________________________________________________________________    Subjective   Denis Pinzon is a 74 y.o. male       Chief Complaint    Annual Exam          74-year-old asymptomatic gentleman returns for follow-up and is doing well without any cardiovascular complaints, nitrate usage, hospitalizations.  Most recent LDL is 119; most recent abdominal aortic aneurysm ultrasound via vascular surgery measured 4 x 4.3 cm in the distal aspect with iliac measurements of 1.6 to 1.9 cm.  Blood pressure is under good control.    He has a history of prior single-vessel PCI of the LAD with previous interventional last with no subsequent symptoms or events.    His wife had complicated medical course this past November December and January with 2 strokes and now remains in a nursing home unfortunately with full assistance.  The past 3 months have been very stressful for Mr. Alfred.    Recommendations: Escalate atorvastatin 80 mg daily, repeat lipid panel with high-sensitivity CRP within the next 6 months, continue follow-up with vascular surgery, will see him again in 1 year         Review of Systems   All other systems reviewed and are negative.           Vitals:    02/05/25 0952   BP: 120/78   BP Location: Right arm   Patient Position: Sitting   Pulse: 82   Weight: 117 kg (258 lb 12.8 oz)   Height: 1.93 m (6' 4\")        Objective   Physical Exam  Constitutional:       Appearance: Normal appearance.   HENT:      " Nose: Nose normal.   Neck:      Vascular: No carotid bruit.   Cardiovascular:      Rate and Rhythm: Normal rate.      Pulses: Normal pulses.      Heart sounds: Normal heart sounds.   Pulmonary:      Effort: Pulmonary effort is normal.   Abdominal:      General: Bowel sounds are normal.      Palpations: Abdomen is soft.   Musculoskeletal:         General: Normal range of motion.      Cervical back: Normal range of motion.      Right lower leg: No edema.      Left lower leg: No edema.   Skin:     General: Skin is warm and dry.   Neurological:      General: No focal deficit present.      Mental Status: He is alert.   Psychiatric:         Mood and Affect: Mood normal.         Behavior: Behavior normal.         Thought Content: Thought content normal.         Judgment: Judgment normal.         Allergies  Patient has no known allergies.     Current Medications    Current Outpatient Medications:   •  allopurinol (Zyloprim) 100 mg tablet, Take 1 tablet (100 mg) by mouth once daily., Disp: , Rfl:   •  aspirin 81 mg EC tablet, Take 1 tablet (81 mg) by mouth once daily., Disp: , Rfl:   •  atorvastatin (Lipitor) 40 mg tablet, Take 1 tablet (40 mg) by mouth once daily at bedtime., Disp: , Rfl:   •  lisinopril 5 mg tablet, Take 1 tablet (5 mg) by mouth once daily., Disp: 90 tablet, Rfl: 3  •  nitroglycerin (Nitrostat) 0.4 mg SL tablet, Place 1 tablet (0.4 mg) under the tongue every 5 minutes if needed., Disp: , Rfl:                      Assessment/Plan   1. Arteriosclerosis of coronary artery  Follow Up In Cardiology      2. History of MI (myocardial infarction)        3. Status post angioplasty        4. Mixed hyperlipidemia        5. Primary hypertension        6. BMI 31.0-31.9,adult        7. Never smoked any substance                 Scribe Attestation  By signing my name below, Galina GARCES LPN  , Scribe   attest that this documentation has been prepared under the direction and in the presence of Charlie Vazquez DO.      Provider Attestation - Scribe documentation    All medical record entries made by the Scribe were at my direction and personally dictated by me. I have reviewed the chart and agree that the record accurately reflects my personal performance of the history, physical exam, discussion and plan.    Applied

## 2025-06-11 NOTE — ED ADULT NURSE NOTE - CAS EDP DISCH DISPOSITION ADMI
Writer received incoming fax from [place] with patients needed medical records. Writer placed them in medical records bin for scanning in.   Telemetry